# Patient Record
Sex: MALE | Employment: OTHER | URBAN - METROPOLITAN AREA
[De-identification: names, ages, dates, MRNs, and addresses within clinical notes are randomized per-mention and may not be internally consistent; named-entity substitution may affect disease eponyms.]

---

## 2017-07-17 ENCOUNTER — TELEPHONE (OUTPATIENT)
Dept: UROLOGY | Facility: CLINIC | Age: 72
End: 2017-07-17

## 2017-07-17 NOTE — TELEPHONE ENCOUNTER
----- Message from Victor Manuel Donis sent at 7/17/2017  3:54 PM CDT -----  Contact: Aung Nieves  X_  1st Request  _  2nd Request  _  3rd Request        Who: Aung Nieves    Why: Patient needs an order to get his PSA blood work done on Aug 8 at [facility]. Please call back to follow up.    What Number to Call Back: [phone]    When to Expect a call back: (With in 24 hours)

## 2017-07-17 NOTE — TELEPHONE ENCOUNTER
----- Message from Yeimy Wang sent at 7/17/2017  4:28 PM CDT -----  Contact: Self  X   1st Request  _  2nd Request  _  3rd Request        Who: KAYCEE KERN [[id number]]    Why: Pt states that he needs his lab work orders mailed [id number]  [place] [address]. Please call pt with any questions, thanks!    What Number to Call Back: [phone]    When to Expect a call back: (With in 24 hours)

## 2017-08-15 ENCOUNTER — OFFICE VISIT (OUTPATIENT)
Dept: UROLOGY | Facility: CLINIC | Age: 72
End: 2017-08-15
Attending: UROLOGY
Payer: MEDICARE

## 2017-08-15 VITALS
DIASTOLIC BLOOD PRESSURE: 83 MMHG | SYSTOLIC BLOOD PRESSURE: 158 MMHG | BODY MASS INDEX: 31.58 KG/M2 | WEIGHT: 185 LBS | HEART RATE: 53 BPM | HEIGHT: 64 IN

## 2017-08-15 DIAGNOSIS — C61 PROSTATE CANCER: Primary | ICD-10-CM

## 2017-08-15 PROCEDURE — 1126F AMNT PAIN NOTED NONE PRSNT: CPT | Mod: S$GLB,,, | Performed by: UROLOGY

## 2017-08-15 PROCEDURE — 1159F MED LIST DOCD IN RCRD: CPT | Mod: S$GLB,,, | Performed by: UROLOGY

## 2017-08-15 PROCEDURE — 3008F BODY MASS INDEX DOCD: CPT | Mod: S$GLB,,, | Performed by: UROLOGY

## 2017-08-15 PROCEDURE — 99214 OFFICE O/P EST MOD 30 MIN: CPT | Mod: S$GLB,,, | Performed by: UROLOGY

## 2017-08-15 NOTE — PROGRESS NOTES
"Subjective:      Aung Nieves is a 72 y.o. male who returns today regarding his     Prostate cancer 4 years status post surgery    Overactive bladder symptoms have resolved and he would like to stop the Ditropan if possible.    Rare low volume leakage with stress.  He wears one pad daily and usually this is not wet    The following portions of the patient's history were reviewed and updated as appropriate: allergies, current medications, past family history, past medical history, past social history, past surgical history and problem list.    Review of Systems  Pertinent items are noted in HPI.  A comprehensive multipoint review of systems was negative except as otherwise stated in the HPI.     Objective:   Vitals: BP (!) 158/83 (BP Location: Right arm, Patient Position: Sitting, BP Method: Large (Automatic))   Pulse (!) 53   Ht 5' 4" (1.626 m)   Wt 83.9 kg (185 lb)   BMI 31.76 kg/m²     Physical Exam   All counseling    Physical Exam    Lab Review   Urinalysis demonstrates no specimen    PSA less than 0.1  Pathology results from [facility] have been requested but were not received  No results found for: WBC, HGB, HCT, MCV, PLT  No results found for: CREATININE, BUN    Imaging  -  Assessment and Plan:   Prostate cancer YENI*4 years  -     Prostate Specific Antigen, Diagnostic; Future; Expected date: 08/13/2018  -     Prostate Specific Antigen, Diagnostic; Future; Expected date: 08/13/2018      [facility]  Stopped Ditropan.  If her urgency and frequency recurs restart  Avoid pm fluids  Avoid caffeine and alcohol  Timed voiding    return to clinic 1 year with PSA  "

## 2018-08-14 ENCOUNTER — OFFICE VISIT (OUTPATIENT)
Dept: UROLOGY | Facility: CLINIC | Age: 73
End: 2018-08-14
Attending: UROLOGY
Payer: MEDICARE

## 2018-08-14 VITALS
BODY MASS INDEX: 32.27 KG/M2 | DIASTOLIC BLOOD PRESSURE: 81 MMHG | HEIGHT: 64 IN | SYSTOLIC BLOOD PRESSURE: 134 MMHG | WEIGHT: 189 LBS | HEART RATE: 62 BPM

## 2018-08-14 DIAGNOSIS — Z85.46 HISTORY OF PROSTATE CANCER: Primary | ICD-10-CM

## 2018-08-14 PROCEDURE — 99214 OFFICE O/P EST MOD 30 MIN: CPT | Mod: S$GLB,,, | Performed by: UROLOGY

## 2018-08-14 NOTE — PROGRESS NOTES
"Subjective:      Aung Nieves is a 73 y.o. male who returns today regarding his     Prostate cancer 5 years status post radical prostatectomy.  Patient also had preoperative urinary retention.  He is voiding well.  He wears 1 pad daily and has occasional stress leakage.  Otherwise no complaints.  He is not having erections.  He spoke to his cardiologist and apparently his cardiologist has got some concerns about him having sexual activity.    His overactive bladder symptoms have resolved with dietary modifications and he has stopped the Ditropan    The following portions of the patient's history were reviewed and updated as appropriate: allergies, current medications, past family history, past medical history, past social history, past surgical history and problem list.    Review of Systems  Pertinent items are noted in HPI.  A comprehensive multipoint review of systems was negative except as otherwise stated in the HPI.     Objective:   Vitals: /81   Pulse 62   Ht 5' 4" (1.626 m)   Wt 85.7 kg (189 lb)   BMI 32.44 kg/m²     Physical Exam   General: alert and oriented, no acute distress  Respiratory: Symmetric expansion, non-labored breathing  Cardiovascular:  no peripheral edema  Abdomen: soft, non distended  Skin: normal coloration and turgor, no rashes, no suspicious skin lesions noted  Neuro: no gross deficits  Psych: normal judgment and insight, normal mood/affect and non-anxious    Physical Exam    Lab Review   Urinalysis demonstrates no specimen    PSA less than 0.1  No results found for: WBC, HGB, HCT, MCV, PLT  No results found for: CREATININE, BUN  No results found for: PSA, PSADIAG    Imaging  -  Assessment and Plan:   History of prostate cancer * 5 years  -     Prostate Specific Antigen, Diagnostic; Future; Expected date: 08/12/2019  -     Prostate Specific Antigen, Diagnostic; Future; Expected date: 08/12/2019     Overactive bladder resolved  Avoid pm fluids  Avoid caffeine and " alcohol  Timed voiding      Return to clinic 1 year with PSA

## 2019-08-20 ENCOUNTER — OFFICE VISIT (OUTPATIENT)
Dept: UROLOGY | Facility: CLINIC | Age: 74
End: 2019-08-20
Payer: MEDICARE

## 2019-08-20 VITALS
HEART RATE: 65 BPM | WEIGHT: 190 LBS | SYSTOLIC BLOOD PRESSURE: 142 MMHG | HEIGHT: 64 IN | BODY MASS INDEX: 32.44 KG/M2 | DIASTOLIC BLOOD PRESSURE: 85 MMHG

## 2019-08-20 DIAGNOSIS — Z85.46 HISTORY OF PROSTATE CANCER: Primary | ICD-10-CM

## 2019-08-20 PROCEDURE — 1101F PR PT FALLS ASSESS DOC 0-1 FALLS W/OUT INJ PAST YR: ICD-10-PCS | Mod: CPTII,S$GLB,, | Performed by: UROLOGY

## 2019-08-20 PROCEDURE — 99214 PR OFFICE/OUTPT VISIT, EST, LEVL IV, 30-39 MIN: ICD-10-PCS | Mod: S$GLB,,, | Performed by: UROLOGY

## 2019-08-20 PROCEDURE — 99214 OFFICE O/P EST MOD 30 MIN: CPT | Mod: S$GLB,,, | Performed by: UROLOGY

## 2019-08-20 PROCEDURE — 1101F PT FALLS ASSESS-DOCD LE1/YR: CPT | Mod: CPTII,S$GLB,, | Performed by: UROLOGY

## 2019-08-20 RX ORDER — IRBESARTAN 300 MG/1
TABLET ORAL
COMMUNITY
Start: 2019-05-27

## 2019-08-20 RX ORDER — DORZOLAMIDE HYDROCHLORIDE AND TIMOLOL MALEATE 20; 5 MG/ML; MG/ML
1 SOLUTION/ DROPS OPHTHALMIC
COMMUNITY
Start: 2013-12-05

## 2019-08-20 NOTE — PROGRESS NOTES
"Subjective:      Jose Nieves is a 74 y.o. male who returns today regarding his history of prostate cancer.    He underwent radical robotic prostatectomy in 2013.  He is voiding well.  He wears 1 pad daily and has occasional stress leakage.  Otherwise no complaints.  He is not having erections.  He spoke to his cardiologist and apparently his cardiologist has got some concerns about him having an MI and being unable to take nitroglycerin if taking PDE5i's. He is OK with current erections and with his urination.    He denies gross hematuria, dysuria, urgency, frequency, hesitancy, incomplete emptying, or intermittency.    The following portions of the patient's history were reviewed and updated as appropriate: allergies, current medications, past family history, past medical history, past social history, past surgical history and problem list.    Review of Systems  Pertinent items are noted in HPI.  A comprehensive multipoint review of systems was negative except as otherwise stated in the HPI.     Objective:   Vitals: BP (!) 142/85   Pulse 65   Ht 5' 4" (1.626 m)   Wt 86.2 kg (190 lb)   BMI 32.61 kg/m²     Physical Exam   General: alert and oriented, no acute distress  Respiratory: Symmetric expansion, non-labored breathing  Cardiovascular:  no peripheral edema  Abdomen: soft, non distended  Skin: normal coloration and turgor, no rashes, no suspicious skin lesions noted  Neuro: no gross deficits  Psych: normal judgment and insight, normal mood/affect and non-anxious    Lab Review   Urinalysis demonstrates trace blood, no RBC in microscopic analysis.    PSA less than 0.1  No results found for: WBC, HGB, HCT, MCV, PLT  No results found for: CREATININE, BUN  No results found for: PSA, PSADIAG    Assessment and Plan:   History of prostate cancer, s/p robotic prostatectomy in 2013  -     Prostate Specific Antigen, Diagnostic; Future; Expected date: 08/20/2020      Return to clinic 1 year with PSA  "

## 2020-01-21 ENCOUNTER — TELEPHONE (OUTPATIENT)
Dept: UROLOGY | Facility: CLINIC | Age: 75
End: 2020-01-21

## 2020-01-21 NOTE — TELEPHONE ENCOUNTER
----- Message from Daniella Cassidyfield sent at 1/21/2020  3:18 PM CST -----  Contact: JEREMIAH KERN   Type:  Patient Returning Call    Who Called: JEREMIAH KERN     Who Left Message for Patient: Lexi Garcia    Does the patient know what this is regarding?: Yes    Best Call Back Number: [phone]    Additional Information:

## 2020-01-21 NOTE — TELEPHONE ENCOUNTER
----- Message from Sofia Bryan sent at 1/21/2020 10:38 AM CST -----  Contact: JEREMIAH KERN [[id number]]  Name of Who is Calling : JEREMIAH KERN [[id number]]    Patient is requesting a call from staff in regards to patient is moving to [place] and would like to know if dr. López could refer a provider    .....Please contact to further discuss and advise.    Can the clinic reply by [facility] : No     What Number to Call Back :  [phone]

## 2020-01-21 NOTE — TELEPHONE ENCOUNTER
Pt called today and asked if there were any urologist in [place] that you could refer him to.  This is my neighbor who moved to [place] and would like to find a provider there.

## 2020-02-11 ENCOUNTER — TELEPHONE (OUTPATIENT)
Dept: UROLOGY | Facility: CLINIC | Age: 75
End: 2020-02-11

## 2020-02-11 NOTE — TELEPHONE ENCOUNTER
----- Message from Kathleen Concepcion sent at 2/11/2020  3:14 PM CST -----  Contact: JEREMIAH KERN [[id number]]  Type:  Patient Returning Call    Who Called: JEREMIAH KERN [[id number]]    Who Left Message for Patient: Kacey Ng    Does the patient know what this is regarding?:Y     Best Call Back Number:[phone]    Additional Information:

## 2020-02-11 NOTE — TELEPHONE ENCOUNTER
----- Message from Chery Ventura sent at 2/11/2020  1:31 PM CST -----  Contact: pt   Name of Who is Calling: JEREMIAH KERN [[id number]]    What is the request in detail: Patient is requesting a call back from Lexi The patient is requesting medical records ..... Please contact to further discuss and advise      Can the clinic reply by [facility]: No     [facility]:  [phone] (home)

## 2022-01-01 ENCOUNTER — HOSPITAL ENCOUNTER (INPATIENT)
Facility: HOSPITAL | Age: 77
LOS: 2 days | DRG: 814 | End: 2022-06-27
Attending: STUDENT IN AN ORGANIZED HEALTH CARE EDUCATION/TRAINING PROGRAM | Admitting: HOSPITALIST
Payer: MEDICARE

## 2022-01-01 VITALS
TEMPERATURE: 99 F | SYSTOLIC BLOOD PRESSURE: 91 MMHG | BODY MASS INDEX: 27.43 KG/M2 | RESPIRATION RATE: 24 BRPM | HEART RATE: 58 BPM | HEIGHT: 69 IN | OXYGEN SATURATION: 98 % | DIASTOLIC BLOOD PRESSURE: 58 MMHG | WEIGHT: 185.19 LBS

## 2022-01-01 DIAGNOSIS — R00.1 SYMPTOMATIC BRADYCARDIA: ICD-10-CM

## 2022-01-01 DIAGNOSIS — R42 DIZZINESS: Primary | ICD-10-CM

## 2022-01-01 DIAGNOSIS — R42 VERTIGO: ICD-10-CM

## 2022-01-01 DIAGNOSIS — D72.89 LEFT-SHIFTED WHITE BLOOD CELLS: ICD-10-CM

## 2022-01-01 DIAGNOSIS — R53.1 WEAKNESS: ICD-10-CM

## 2022-01-01 DIAGNOSIS — I50.9 CHF (CONGESTIVE HEART FAILURE): ICD-10-CM

## 2022-01-01 DIAGNOSIS — R52 PAIN: ICD-10-CM

## 2022-01-01 DIAGNOSIS — R42 LIGHTHEADEDNESS: ICD-10-CM

## 2022-01-01 DIAGNOSIS — D72.829 LEUKOCYTOSIS, UNSPECIFIED TYPE: ICD-10-CM

## 2022-01-01 LAB
ABO + RH BLD: NORMAL
ABORH RETYPE: NORMAL
ABS NEUT (OLG): 22.31 X10(3)/MCL (ref 2.1–9.2)
ALBUMIN SERPL-MCNC: 2 GM/DL (ref 3.4–4.8)
ALBUMIN SERPL-MCNC: 2.1 GM/DL (ref 3.4–4.8)
ALBUMIN SERPL-MCNC: 2.2 GM/DL (ref 3.4–4.8)
ALBUMIN SERPL-MCNC: 2.3 GM/DL (ref 3.4–4.8)
ALBUMIN/GLOB SERPL: 0.7 RATIO (ref 1.1–2)
ALBUMIN/GLOB SERPL: 0.8 RATIO (ref 1.1–2)
ALP SERPL-CCNC: 79 UNIT/L (ref 40–150)
ALP SERPL-CCNC: 81 UNIT/L (ref 40–150)
ALP SERPL-CCNC: 93 UNIT/L (ref 40–150)
ALP SERPL-CCNC: 95 UNIT/L (ref 40–150)
ALT SERPL-CCNC: 63 UNIT/L (ref 0–55)
ALT SERPL-CCNC: 68 UNIT/L (ref 0–55)
ALT SERPL-CCNC: 70 UNIT/L (ref 0–55)
ALT SERPL-CCNC: 78 UNIT/L (ref 0–55)
APPEARANCE UR: CLEAR
APPEARANCE UR: CLEAR
APTT PPP: 36.3 SECONDS (ref 23.2–33.7)
AST SERPL-CCNC: 48 UNIT/L (ref 5–34)
AST SERPL-CCNC: 49 UNIT/L (ref 5–34)
AST SERPL-CCNC: 56 UNIT/L (ref 5–34)
AST SERPL-CCNC: 61 UNIT/L (ref 5–34)
AV INDEX (PROSTH): 0.61
AV MEAN GRADIENT: 6 MMHG
AV PEAK GRADIENT: 11 MMHG
AV VALVE AREA: 2.3 CM2
AV VELOCITY RATIO: 0.62
BACTERIA #/AREA URNS AUTO: ABNORMAL /HPF
BACTERIA #/AREA URNS AUTO: ABNORMAL /HPF
BASE EXCESS ARTERIAL: NORMAL
BASOPHILS # BLD AUTO: 0.03 X10(3)/MCL (ref 0–0.2)
BASOPHILS # BLD AUTO: 0.04 X10(3)/MCL (ref 0–0.2)
BASOPHILS # BLD AUTO: 0.06 X10(3)/MCL (ref 0–0.2)
BASOPHILS NFR BLD AUTO: 0.2 %
BASOPHILS NFR BLD AUTO: 0.4 %
BASOPHILS NFR BLD AUTO: 0.5 %
BILIRUB UR QL STRIP.AUTO: ABNORMAL MG/DL
BILIRUB UR QL STRIP.AUTO: NEGATIVE MG/DL
BILIRUBIN DIRECT+TOT PNL SERPL-MCNC: 1.2 MG/DL
BILIRUBIN DIRECT+TOT PNL SERPL-MCNC: 1.2 MG/DL
BILIRUBIN DIRECT+TOT PNL SERPL-MCNC: 1.4 MG/DL
BILIRUBIN DIRECT+TOT PNL SERPL-MCNC: 2.3 MG/DL
BLD PROD TYP BPU: NORMAL
BLOOD UNIT EXPIRATION DATE: NORMAL
BLOOD UNIT TYPE CODE: 5100
BNP BLD-MCNC: 397.9 PG/ML
BSA FOR ECHO PROCEDURE: 2.02 M2
BUN SERPL-MCNC: 18.3 MG/DL (ref 8.4–25.7)
BUN SERPL-MCNC: 19.3 MG/DL (ref 8.4–25.7)
BUN SERPL-MCNC: 22.1 MG/DL (ref 8.4–25.7)
BUN SERPL-MCNC: 23 MG/DL (ref 8.4–25.7)
CALCIUM SERPL-MCNC: 8.1 MG/DL (ref 8.8–10)
CALCIUM SERPL-MCNC: 8.5 MG/DL (ref 8.8–10)
CALCIUM SERPL-MCNC: 8.5 MG/DL (ref 8.8–10)
CALCIUM SERPL-MCNC: 8.7 MG/DL (ref 8.8–10)
CHLORIDE SERPL-SCNC: 108 MMOL/L (ref 98–107)
CHLORIDE SERPL-SCNC: 109 MMOL/L (ref 98–107)
CHLORIDE SERPL-SCNC: 110 MMOL/L (ref 98–107)
CHLORIDE SERPL-SCNC: 110 MMOL/L (ref 98–107)
CO2 SERPL-SCNC: 20 MMOL/L (ref 23–31)
CO2 SERPL-SCNC: 21 MMOL/L (ref 23–31)
CO2 SERPL-SCNC: 23 MMOL/L (ref 23–31)
CO2 SERPL-SCNC: 23 MMOL/L (ref 23–31)
COLOR UR AUTO: ABNORMAL
COLOR UR AUTO: ABNORMAL
CREAT SERPL-MCNC: 1.01 MG/DL (ref 0.73–1.18)
CREAT SERPL-MCNC: 1.05 MG/DL (ref 0.73–1.18)
CREAT SERPL-MCNC: 1.17 MG/DL (ref 0.73–1.18)
CREAT SERPL-MCNC: 1.59 MG/DL (ref 0.73–1.18)
CROSSMATCH INTERPRETATION: NORMAL
CV ECHO LV RWT: 0.48 CM
DISPENSE STATUS: NORMAL
DOP CALC AO PEAK VEL: 1.64 M/S
DOP CALC AO VTI: 34.2 CM
DOP CALC LVOT AREA: 3.8 CM2
DOP CALC LVOT DIAMETER: 2.2 CM
DOP CALC LVOT PEAK VEL: 1.02 M/S
DOP CALC LVOT STROKE VOLUME: 78.65 CM3
DOP CALC MV VTI: 37.8 CM
DOP CALCLVOT PEAK VEL VTI: 20.7 CM
E WAVE DECELERATION TIME: 182 MSEC
E/A RATIO: 0.97
E/E' RATIO: 13.71 M/S
ECHO LV POSTERIOR WALL: 1.13 CM (ref 0.6–1.1)
EJECTION FRACTION: 55 %
EOSINOPHIL # BLD AUTO: 0.06 X10(3)/MCL (ref 0–0.9)
EOSINOPHIL # BLD AUTO: 0.13 X10(3)/MCL (ref 0–0.9)
EOSINOPHIL # BLD AUTO: 0.16 X10(3)/MCL (ref 0–0.9)
EOSINOPHIL NFR BLD AUTO: 0.5 %
EOSINOPHIL NFR BLD AUTO: 1 %
EOSINOPHIL NFR BLD AUTO: 1.7 %
ERYTHROCYTE [DISTWIDTH] IN BLOOD BY AUTOMATED COUNT: 15.5 % (ref 11.5–17)
ERYTHROCYTE [DISTWIDTH] IN BLOOD BY AUTOMATED COUNT: 15.7 % (ref 11.5–17)
ERYTHROCYTE [DISTWIDTH] IN BLOOD BY AUTOMATED COUNT: 15.9 % (ref 11.5–17)
ERYTHROCYTE [DISTWIDTH] IN BLOOD BY AUTOMATED COUNT: 15.9 % (ref 11.5–17)
FIBRINOGEN PPP-MCNC: 471 MG/DL (ref 210–463)
FLUAV AG UPPER RESP QL IA.RAPID: NOT DETECTED
FLUBV AG UPPER RESP QL IA.RAPID: NOT DETECTED
FRACTIONAL SHORTENING: 26 % (ref 28–44)
GLOBULIN SER-MCNC: 2.5 GM/DL (ref 2.4–3.5)
GLOBULIN SER-MCNC: 2.5 GM/DL (ref 2.4–3.5)
GLOBULIN SER-MCNC: 2.8 GM/DL (ref 2.4–3.5)
GLOBULIN SER-MCNC: 3.1 GM/DL (ref 2.4–3.5)
GLUCOSE SERPL-MCNC: 104 MG/DL (ref 82–115)
GLUCOSE SERPL-MCNC: 112 MG/DL (ref 82–115)
GLUCOSE SERPL-MCNC: 143 MG/DL (ref 82–115)
GLUCOSE SERPL-MCNC: 79 MG/DL (ref 82–115)
GLUCOSE UR QL STRIP.AUTO: NEGATIVE MG/DL
GLUCOSE UR QL STRIP.AUTO: NEGATIVE MG/DL
GROUP & RH: NORMAL
HAV IGM SERPL QL IA: NONREACTIVE
HBV CORE IGM SERPL QL IA: NONREACTIVE
HBV SURFACE AG SERPL QL IA: NONREACTIVE
HCO3 ARTERIAL: NORMAL
HCT VFR BLD AUTO: 37.6 % (ref 42–52)
HCT VFR BLD AUTO: 37.9 % (ref 42–52)
HCT VFR BLD AUTO: 39.7 % (ref 42–52)
HCT VFR BLD AUTO: 41.2 % (ref 42–52)
HCV AB SERPL QL IA: NONREACTIVE
HGB BLD-MCNC: 11.9 GM/DL (ref 14–18)
HGB BLD-MCNC: 12.3 GM/DL (ref 14–18)
HGB BLD-MCNC: 12.5 GM/DL (ref 14–18)
HGB BLD-MCNC: 13.1 GM/DL (ref 14–18)
HGB BLD-MCNC: NORMAL G/DL
IMM GRANULOCYTES # BLD AUTO: 0.11 X10(3)/MCL (ref 0–0.02)
IMM GRANULOCYTES # BLD AUTO: 0.11 X10(3)/MCL (ref 0–0.02)
IMM GRANULOCYTES # BLD AUTO: 0.14 X10(3)/MCL (ref 0–0.02)
IMM GRANULOCYTES # BLD AUTO: 0.3 X10(3)/MCL (ref 0–0.02)
IMM GRANULOCYTES NFR BLD AUTO: 0.9 % (ref 0–0.43)
IMM GRANULOCYTES NFR BLD AUTO: 1.1 % (ref 0–0.43)
IMM GRANULOCYTES NFR BLD AUTO: 1.2 % (ref 0–0.43)
IMM GRANULOCYTES NFR BLD AUTO: 1.3 % (ref 0–0.43)
INDIRECT COOMBS GEL: NORMAL
INR BLD: 1.28 (ref 0–1.3)
INR BLD: 1.64 (ref 0–1.3)
INSTRUMENT WBC (OLG): 23 X10(3)/MCL
INTERVENTRICULAR SEPTUM: 1.13 CM (ref 0.6–1.1)
KETONES UR QL STRIP.AUTO: ABNORMAL MG/DL
KETONES UR QL STRIP.AUTO: NEGATIVE MG/DL
LACTATE SERPL-SCNC: 0.8 MMOL/L (ref 0.5–2.2)
LACTATE SERPL-SCNC: 1.3 MMOL/L (ref 0.5–2.2)
LEFT ATRIUM SIZE: 3.6 CM
LEFT INTERNAL DIMENSION IN SYSTOLE: 3.47 CM (ref 2.1–4)
LEFT VENTRICLE DIASTOLIC VOLUME INDEX: 51.5 ML/M2
LEFT VENTRICLE DIASTOLIC VOLUME: 103 ML
LEFT VENTRICLE MASS INDEX: 98 G/M2
LEFT VENTRICLE SYSTOLIC VOLUME INDEX: 24.9 ML/M2
LEFT VENTRICLE SYSTOLIC VOLUME: 49.8 ML
LEFT VENTRICULAR INTERNAL DIMENSION IN DIASTOLE: 4.72 CM (ref 3.5–6)
LEFT VENTRICULAR MASS: 196.05 G
LEUKOCYTE ESTERASE UR QL STRIP.AUTO: ABNORMAL UNIT/L
LEUKOCYTE ESTERASE UR QL STRIP.AUTO: NEGATIVE UNIT/L
LIPASE SERPL-CCNC: 7 U/L
LV LATERAL E/E' RATIO: 12 M/S
LV SEPTAL E/E' RATIO: 16 M/S
LVOT MG: 2 MMHG
LVOT MV: 0.6 CM/S
LYMPHOCYTES # BLD AUTO: 0.89 X10(3)/MCL (ref 0.6–4.6)
LYMPHOCYTES # BLD AUTO: 0.91 X10(3)/MCL (ref 0.6–4.6)
LYMPHOCYTES # BLD AUTO: 1.02 X10(3)/MCL (ref 0.6–4.6)
LYMPHOCYTES NFR BLD AUTO: 11 %
LYMPHOCYTES NFR BLD AUTO: 6.9 %
LYMPHOCYTES NFR BLD AUTO: 7.1 %
LYMPHOCYTES NFR BLD MANUAL: 0.23 X10(3)/MCL
LYMPHOCYTES NFR BLD MANUAL: 1 %
MCH RBC QN AUTO: 30.5 PG (ref 27–31)
MCH RBC QN AUTO: 30.5 PG (ref 27–31)
MCH RBC QN AUTO: 30.6 PG (ref 27–31)
MCH RBC QN AUTO: 31.3 PG (ref 27–31)
MCHC RBC AUTO-ENTMCNC: 31 MG/DL (ref 33–36)
MCHC RBC AUTO-ENTMCNC: 31.6 MG/DL (ref 33–36)
MCHC RBC AUTO-ENTMCNC: 31.8 MG/DL (ref 33–36)
MCHC RBC AUTO-ENTMCNC: 33 MG/DL (ref 33–36)
MCV RBC AUTO: 94.8 FL (ref 80–94)
MCV RBC AUTO: 95.8 FL (ref 80–94)
MCV RBC AUTO: 96.7 FL (ref 80–94)
MCV RBC AUTO: 98.5 FL (ref 80–94)
METAMYELOCYTES NFR BLD MANUAL: 1 %
MONOCYTES # BLD AUTO: 0.67 X10(3)/MCL (ref 0.1–1.3)
MONOCYTES # BLD AUTO: 0.68 X10(3)/MCL (ref 0.1–1.3)
MONOCYTES # BLD AUTO: 0.75 X10(3)/MCL (ref 0.1–1.3)
MONOCYTES NFR BLD AUTO: 5.3 %
MONOCYTES NFR BLD AUTO: 5.8 %
MONOCYTES NFR BLD AUTO: 7.2 %
MONOCYTES NFR BLD MANUAL: 0.69 X10(3)/MCL (ref 0.1–1.3)
MONOCYTES NFR BLD MANUAL: 3 %
MV MEAN GRADIENT: 2 MMHG
MV PEAK A VEL: 0.99 M/S
MV PEAK E VEL: 0.96 M/S
MV PEAK GRADIENT: 4 MMHG
MV VALVE AREA BY CONTINUITY EQUATION: 2.08 CM2
NEUTROPHILS # BLD AUTO: 10.9 X10(3)/MCL (ref 2.1–9.2)
NEUTROPHILS # BLD AUTO: 11.1 X10(3)/MCL (ref 2.1–9.2)
NEUTROPHILS # BLD AUTO: 7.3 X10(3)/MCL (ref 2.1–9.2)
NEUTROPHILS NFR BLD AUTO: 78.5 %
NEUTROPHILS NFR BLD AUTO: 85 %
NEUTROPHILS NFR BLD AUTO: 85.7 %
NEUTROPHILS NFR BLD MANUAL: 96 %
NITRITE UR QL STRIP.AUTO: NEGATIVE
NITRITE UR QL STRIP.AUTO: NEGATIVE
NRBC BLD AUTO-RTO: 0 %
PCO2 BLDA: 29 MMHG
PCO2 BLDA: NORMAL MM[HG]
PCO2 BLDA: NORMAL MM[HG]
PH SMN: 7.46 [PH] (ref 7.35–7.45)
PH SMN: NORMAL [PH]
PH UR STRIP.AUTO: 5.5 [PH]
PH UR STRIP.AUTO: 5.5 [PH]
PISA TR MAX VEL: 2.71 M/S
PLATELET # BLD AUTO: 132 X10(3)/MCL (ref 130–400)
PLATELET # BLD AUTO: 145 X10(3)/MCL (ref 130–400)
PLATELET # BLD AUTO: 159 X10(3)/MCL (ref 130–400)
PLATELET # BLD AUTO: 160 X10(3)/MCL (ref 130–400)
PLATELET # BLD EST: NORMAL 10*3/UL
PMV BLD AUTO: 11.1 FL (ref 9.4–12.4)
PMV BLD AUTO: 11.1 FL (ref 9.4–12.4)
PMV BLD AUTO: 11.3 FL (ref 9.4–12.4)
PMV BLD AUTO: 11.3 FL (ref 9.4–12.4)
PO2 BLDA: 128 MMHG
PO2 BLDA: NORMAL MM[HG]
POC BASE DEFICIT: -2.2 MMOL/L
POC COHB: NORMAL
POC HCO3: 20.6 MMOL/L
POC IONIZED CALCIUM: 1.23 MMOL/L
POC IONIZED CALCIUM: NORMAL
POC METHB: NORMAL
POC O2HB: NORMAL
POC SATURATED O2: 99 %
POC TEMPERATURE: 37 C
POTASSIUM BLD-SCNC: 3.8 MMOL/L
POTASSIUM BLD-SCNC: NORMAL MMOL/L
POTASSIUM SERPL-SCNC: 3.6 MMOL/L (ref 3.5–5.1)
POTASSIUM SERPL-SCNC: 3.8 MMOL/L (ref 3.5–5.1)
POTASSIUM SERPL-SCNC: 3.8 MMOL/L (ref 3.5–5.1)
POTASSIUM SERPL-SCNC: 4.8 MMOL/L (ref 3.5–5.1)
PROT SERPL-MCNC: 4.5 GM/DL (ref 5.8–7.6)
PROT SERPL-MCNC: 4.6 GM/DL (ref 5.8–7.6)
PROT SERPL-MCNC: 5.1 GM/DL (ref 5.8–7.6)
PROT SERPL-MCNC: 5.3 GM/DL (ref 5.8–7.6)
PROT UR QL STRIP.AUTO: ABNORMAL MG/DL
PROT UR QL STRIP.AUTO: NEGATIVE MG/DL
PROTHROMBIN TIME: 15.9 SECONDS (ref 12.5–14.5)
PROTHROMBIN TIME: 19.2 SECONDS (ref 12.5–14.5)
RA PRESSURE: 3 MMHG
RBC # BLD AUTO: 3.89 X10(6)/MCL (ref 4.7–6.1)
RBC # BLD AUTO: 4 X10(6)/MCL (ref 4.7–6.1)
RBC # BLD AUTO: 4.03 X10(6)/MCL (ref 4.7–6.1)
RBC # BLD AUTO: 4.3 X10(6)/MCL (ref 4.7–6.1)
RBC #/AREA URNS AUTO: 10 /HPF
RBC #/AREA URNS AUTO: 15 /HPF
RBC UR QL AUTO: ABNORMAL UNIT/L
RBC UR QL AUTO: ABNORMAL UNIT/L
SARS-COV-2 RNA RESP QL NAA+PROBE: NOT DETECTED
SATURATED O2 ARTERIAL, I-STAT: NORMAL
SODIUM BLD-SCNC: 135 MMOL/L (ref 137–145)
SODIUM BLD-SCNC: NORMAL MMOL/L
SODIUM SERPL-SCNC: 136 MMOL/L (ref 136–145)
SODIUM SERPL-SCNC: 136 MMOL/L (ref 136–145)
SODIUM SERPL-SCNC: 137 MMOL/L (ref 136–145)
SODIUM SERPL-SCNC: 137 MMOL/L (ref 136–145)
SP GR UR STRIP.AUTO: 1.01 (ref 1–1.03)
SP GR UR STRIP.AUTO: 1.02 (ref 1–1.03)
SPECIMEN SOURCE: ABNORMAL
SQUAMOUS #/AREA URNS AUTO: <5 /HPF
SQUAMOUS #/AREA URNS AUTO: <5 /HPF
T3FREE SERPL-MCNC: 1.45 PG/ML (ref 1.57–3.91)
T4 FREE SERPL-MCNC: 1.27 NG/DL (ref 0.7–1.48)
T4 FREE SERPL-MCNC: 1.47 NG/DL (ref 0.7–1.48)
TDI LATERAL: 0.08 M/S
TDI SEPTAL: 0.06 M/S
TDI: 0.07 M/S
TR MAX PG: 29 MMHG
TRICUSPID ANNULAR PLANE SYSTOLIC EXCURSION: 2.72 CM
TROPONIN I SERPL-MCNC: 0.04 NG/ML (ref 0–0.04)
TROPONIN I SERPL-MCNC: 0.04 NG/ML (ref 0–0.04)
TROPONIN I SERPL-MCNC: 0.06 NG/ML (ref 0–0.04)
TROPONIN I SERPL-MCNC: 0.07 NG/ML (ref 0–0.04)
TSH SERPL-ACNC: 0.22 UIU/ML (ref 0.35–4.94)
TV REST PULMONARY ARTERY PRESSURE: 32 MMHG
UNIT NUMBER: NORMAL
UROBILINOGEN UR STRIP-ACNC: 2 MG/DL
UROBILINOGEN UR STRIP-ACNC: 2 MG/DL
WBC # SPEC AUTO: 12.8 X10(3)/MCL (ref 4.5–11.5)
WBC # SPEC AUTO: 12.9 X10(3)/MCL (ref 4.5–11.5)
WBC # SPEC AUTO: 23 X10(3)/MCL (ref 4.5–11.5)
WBC # SPEC AUTO: 9.3 X10(3)/MCL (ref 4.5–11.5)
WBC #/AREA URNS AUTO: <5 /HPF
WBC #/AREA URNS AUTO: <5 /HPF

## 2022-01-01 PROCEDURE — 96374 THER/PROPH/DIAG INJ IV PUSH: CPT | Mod: 59

## 2022-01-01 PROCEDURE — 25000003 PHARM REV CODE 250: Performed by: PHYSICIAN ASSISTANT

## 2022-01-01 PROCEDURE — 36415 COLL VENOUS BLD VENIPUNCTURE: CPT | Performed by: STUDENT IN AN ORGANIZED HEALTH CARE EDUCATION/TRAINING PROGRAM

## 2022-01-01 PROCEDURE — 99223 PR INITIAL HOSPITAL CARE,LEVL III: ICD-10-PCS | Mod: ,,, | Performed by: NURSE PRACTITIONER

## 2022-01-01 PROCEDURE — 51798 US URINE CAPACITY MEASURE: CPT

## 2022-01-01 PROCEDURE — 63600175 PHARM REV CODE 636 W HCPCS: Performed by: PHYSICIAN ASSISTANT

## 2022-01-01 PROCEDURE — 63600150 PHARM REV CODE 636

## 2022-01-01 PROCEDURE — 63600175 PHARM REV CODE 636 W HCPCS: Performed by: INTERNAL MEDICINE

## 2022-01-01 PROCEDURE — 99223 1ST HOSP IP/OBS HIGH 75: CPT | Mod: ,,, | Performed by: NURSE PRACTITIONER

## 2022-01-01 PROCEDURE — 85025 COMPLETE CBC W/AUTO DIFF WBC: CPT | Performed by: STUDENT IN AN ORGANIZED HEALTH CARE EDUCATION/TRAINING PROGRAM

## 2022-01-01 PROCEDURE — 93005 ELECTROCARDIOGRAM TRACING: CPT

## 2022-01-01 PROCEDURE — 96360 HYDRATION IV INFUSION INIT: CPT | Mod: 59

## 2022-01-01 PROCEDURE — 99900035 HC TECH TIME PER 15 MIN (STAT)

## 2022-01-01 PROCEDURE — 80053 COMPREHEN METABOLIC PANEL: CPT | Performed by: PHYSICIAN ASSISTANT

## 2022-01-01 PROCEDURE — 36415 COLL VENOUS BLD VENIPUNCTURE: CPT | Performed by: PHYSICIAN ASSISTANT

## 2022-01-01 PROCEDURE — 87040 BLOOD CULTURE FOR BACTERIA: CPT | Performed by: STUDENT IN AN ORGANIZED HEALTH CARE EDUCATION/TRAINING PROGRAM

## 2022-01-01 PROCEDURE — 36600 WITHDRAWAL OF ARTERIAL BLOOD: CPT

## 2022-01-01 PROCEDURE — 85730 THROMBOPLASTIN TIME PARTIAL: CPT | Performed by: STUDENT IN AN ORGANIZED HEALTH CARE EDUCATION/TRAINING PROGRAM

## 2022-01-01 PROCEDURE — 83690 ASSAY OF LIPASE: CPT | Performed by: STUDENT IN AN ORGANIZED HEALTH CARE EDUCATION/TRAINING PROGRAM

## 2022-01-01 PROCEDURE — 27000221 HC OXYGEN, UP TO 24 HOURS

## 2022-01-01 PROCEDURE — 94002 VENT MGMT INPAT INIT DAY: CPT

## 2022-01-01 PROCEDURE — 85610 PROTHROMBIN TIME: CPT | Performed by: STUDENT IN AN ORGANIZED HEALTH CARE EDUCATION/TRAINING PROGRAM

## 2022-01-01 PROCEDURE — 63600175 PHARM REV CODE 636 W HCPCS

## 2022-01-01 PROCEDURE — 84484 ASSAY OF TROPONIN QUANT: CPT | Performed by: PHYSICIAN ASSISTANT

## 2022-01-01 PROCEDURE — 36415 COLL VENOUS BLD VENIPUNCTURE: CPT | Performed by: HOSPITALIST

## 2022-01-01 PROCEDURE — 83605 ASSAY OF LACTIC ACID: CPT | Performed by: STUDENT IN AN ORGANIZED HEALTH CARE EDUCATION/TRAINING PROGRAM

## 2022-01-01 PROCEDURE — 25000003 PHARM REV CODE 250: Performed by: STUDENT IN AN ORGANIZED HEALTH CARE EDUCATION/TRAINING PROGRAM

## 2022-01-01 PROCEDURE — 86965 POOLING BLOOD PLATELETS: CPT | Performed by: STUDENT IN AN ORGANIZED HEALTH CARE EDUCATION/TRAINING PROGRAM

## 2022-01-01 PROCEDURE — 84439 ASSAY OF FREE THYROXINE: CPT | Performed by: HOSPITALIST

## 2022-01-01 PROCEDURE — 85384 FIBRINOGEN ACTIVITY: CPT | Performed by: STUDENT IN AN ORGANIZED HEALTH CARE EDUCATION/TRAINING PROGRAM

## 2022-01-01 PROCEDURE — 87636 SARSCOV2 & INF A&B AMP PRB: CPT | Performed by: STUDENT IN AN ORGANIZED HEALTH CARE EDUCATION/TRAINING PROGRAM

## 2022-01-01 PROCEDURE — P9012 CRYOPRECIPITATE EACH UNIT: HCPCS | Performed by: STUDENT IN AN ORGANIZED HEALTH CARE EDUCATION/TRAINING PROGRAM

## 2022-01-01 PROCEDURE — 80053 COMPREHEN METABOLIC PANEL: CPT | Performed by: STUDENT IN AN ORGANIZED HEALTH CARE EDUCATION/TRAINING PROGRAM

## 2022-01-01 PROCEDURE — 80074 ACUTE HEPATITIS PANEL: CPT | Performed by: HOSPITALIST

## 2022-01-01 PROCEDURE — 99285 EMERGENCY DEPT VISIT HI MDM: CPT | Mod: 25

## 2022-01-01 PROCEDURE — 97535 SELF CARE MNGMENT TRAINING: CPT

## 2022-01-01 PROCEDURE — 36430 TRANSFUSION BLD/BLD COMPNT: CPT

## 2022-01-01 PROCEDURE — 63600175 PHARM REV CODE 636 W HCPCS: Performed by: STUDENT IN AN ORGANIZED HEALTH CARE EDUCATION/TRAINING PROGRAM

## 2022-01-01 PROCEDURE — 86901 BLOOD TYPING SEROLOGIC RH(D): CPT | Performed by: INTERNAL MEDICINE

## 2022-01-01 PROCEDURE — 84439 ASSAY OF FREE THYROXINE: CPT | Performed by: STUDENT IN AN ORGANIZED HEALTH CARE EDUCATION/TRAINING PROGRAM

## 2022-01-01 PROCEDURE — 84481 FREE ASSAY (FT-3): CPT | Performed by: PHYSICIAN ASSISTANT

## 2022-01-01 PROCEDURE — 85025 COMPLETE CBC W/AUTO DIFF WBC: CPT | Performed by: HOSPITALIST

## 2022-01-01 PROCEDURE — 83880 ASSAY OF NATRIURETIC PEPTIDE: CPT | Performed by: STUDENT IN AN ORGANIZED HEALTH CARE EDUCATION/TRAINING PROGRAM

## 2022-01-01 PROCEDURE — 82803 BLOOD GASES ANY COMBINATION: CPT

## 2022-01-01 PROCEDURE — 84484 ASSAY OF TROPONIN QUANT: CPT | Performed by: STUDENT IN AN ORGANIZED HEALTH CARE EDUCATION/TRAINING PROGRAM

## 2022-01-01 PROCEDURE — 11000001 HC ACUTE MED/SURG PRIVATE ROOM

## 2022-01-01 PROCEDURE — 81001 URINALYSIS AUTO W/SCOPE: CPT | Performed by: STUDENT IN AN ORGANIZED HEALTH CARE EDUCATION/TRAINING PROGRAM

## 2022-01-01 PROCEDURE — 84443 ASSAY THYROID STIM HORMONE: CPT | Performed by: STUDENT IN AN ORGANIZED HEALTH CARE EDUCATION/TRAINING PROGRAM

## 2022-01-01 PROCEDURE — 80053 COMPREHEN METABOLIC PANEL: CPT | Performed by: HOSPITALIST

## 2022-01-01 PROCEDURE — 99223 PR INITIAL HOSPITAL CARE,LEVL III: ICD-10-PCS | Mod: ,,, | Performed by: PSYCHIATRY & NEUROLOGY

## 2022-01-01 PROCEDURE — 99223 1ST HOSP IP/OBS HIGH 75: CPT | Mod: ,,, | Performed by: PSYCHIATRY & NEUROLOGY

## 2022-01-01 PROCEDURE — 85025 COMPLETE CBC W/AUTO DIFF WBC: CPT | Performed by: PHYSICIAN ASSISTANT

## 2022-01-01 PROCEDURE — 51701 INSERT BLADDER CATHETER: CPT

## 2022-01-01 PROCEDURE — 27200966 HC CLOSED SUCTION SYSTEM

## 2022-01-01 PROCEDURE — P9017 PLASMA 1 DONOR FRZ W/IN 8 HR: HCPCS | Performed by: STUDENT IN AN ORGANIZED HEALTH CARE EDUCATION/TRAINING PROGRAM

## 2022-01-01 PROCEDURE — 97166 OT EVAL MOD COMPLEX 45 MIN: CPT

## 2022-01-01 PROCEDURE — 94761 N-INVAS EAR/PLS OXIMETRY MLT: CPT

## 2022-01-01 PROCEDURE — 25000003 PHARM REV CODE 250: Performed by: HOSPITALIST

## 2022-01-01 RX ORDER — SODIUM CHLORIDE 0.9 % (FLUSH) 0.9 %
10 SYRINGE (ML) INJECTION EVERY 12 HOURS PRN
Status: DISCONTINUED | OUTPATIENT
Start: 2022-01-01 | End: 2022-01-01 | Stop reason: HOSPADM

## 2022-01-01 RX ORDER — FAMOTIDINE 10 MG/ML
20 INJECTION INTRAVENOUS 2 TIMES DAILY
Status: DISCONTINUED | OUTPATIENT
Start: 2022-01-01 | End: 2022-01-01 | Stop reason: HOSPADM

## 2022-01-01 RX ORDER — MORPHINE SULFATE 4 MG/ML
2 INJECTION, SOLUTION INTRAMUSCULAR; INTRAVENOUS EVERY 30 MIN PRN
Status: DISCONTINUED | OUTPATIENT
Start: 2022-01-01 | End: 2022-01-01 | Stop reason: HOSPADM

## 2022-01-01 RX ORDER — FENTANYL CITRATE 50 UG/ML
50 INJECTION, SOLUTION INTRAMUSCULAR; INTRAVENOUS
Status: DISCONTINUED | OUTPATIENT
Start: 2022-01-01 | End: 2022-01-01 | Stop reason: HOSPADM

## 2022-01-01 RX ORDER — GLYCOPYRROLATE 1 MG/5ML
1 SOLUTION ORAL 3 TIMES DAILY PRN
Status: DISCONTINUED | OUTPATIENT
Start: 2022-01-01 | End: 2022-01-01

## 2022-01-01 RX ORDER — SODIUM CHLORIDE 0.9 % (FLUSH) 0.9 %
10 SYRINGE (ML) INJECTION
Status: DISCONTINUED | OUTPATIENT
Start: 2022-01-01 | End: 2022-01-01 | Stop reason: HOSPADM

## 2022-01-01 RX ORDER — PROPOFOL 10 MG/ML
VIAL (ML) INTRAVENOUS CODE/TRAUMA/SEDATION MEDICATION
Status: COMPLETED | OUTPATIENT
Start: 2022-01-01 | End: 2022-01-01

## 2022-01-01 RX ORDER — HYDROMORPHONE HYDROCHLORIDE 2 MG/ML
0.2 INJECTION, SOLUTION INTRAMUSCULAR; INTRAVENOUS; SUBCUTANEOUS EVERY 10 MIN PRN
Status: DISCONTINUED | OUTPATIENT
Start: 2022-01-01 | End: 2022-01-01 | Stop reason: HOSPADM

## 2022-01-01 RX ORDER — HYDROCODONE BITARTRATE AND ACETAMINOPHEN 500; 5 MG/1; MG/1
TABLET ORAL
Status: DISCONTINUED | OUTPATIENT
Start: 2022-01-01 | End: 2022-01-01 | Stop reason: HOSPADM

## 2022-01-01 RX ORDER — ACETAMINOPHEN 325 MG/1
650 TABLET ORAL EVERY 4 HOURS PRN
Status: DISCONTINUED | OUTPATIENT
Start: 2022-01-01 | End: 2022-01-01 | Stop reason: HOSPADM

## 2022-01-01 RX ORDER — DOPAMINE HYDROCHLORIDE 320 MG/100ML
INJECTION, SOLUTION INTRAVENOUS
Status: COMPLETED
Start: 2022-01-01 | End: 2022-01-01

## 2022-01-01 RX ORDER — MECLIZINE HYDROCHLORIDE 25 MG/1
25 TABLET ORAL
Status: COMPLETED | OUTPATIENT
Start: 2022-01-01 | End: 2022-01-01

## 2022-01-01 RX ORDER — HYDROCODONE BITARTRATE AND ACETAMINOPHEN 5; 325 MG/1; MG/1
1 TABLET ORAL EVERY 6 HOURS PRN
Status: DISCONTINUED | OUTPATIENT
Start: 2022-01-01 | End: 2022-01-01 | Stop reason: HOSPADM

## 2022-01-01 RX ORDER — FENTANYL CITRATE 50 UG/ML
50 INJECTION, SOLUTION INTRAMUSCULAR; INTRAVENOUS
Status: DISCONTINUED | OUTPATIENT
Start: 2022-07-01 | End: 2022-01-01 | Stop reason: HOSPADM

## 2022-01-01 RX ORDER — GLUCAGON 1 MG
1 KIT INJECTION
Status: DISCONTINUED | OUTPATIENT
Start: 2022-01-01 | End: 2022-01-01 | Stop reason: HOSPADM

## 2022-01-01 RX ORDER — ATROPINE SULFATE 0.1 MG/ML
INJECTION INTRAVENOUS
Status: COMPLETED
Start: 2022-01-01 | End: 2022-01-01

## 2022-01-01 RX ORDER — AMIODARONE HYDROCHLORIDE 200 MG/1
200 TABLET ORAL 2 TIMES DAILY
Status: DISCONTINUED | OUTPATIENT
Start: 2022-01-01 | End: 2022-01-01 | Stop reason: HOSPADM

## 2022-01-01 RX ORDER — DEXMEDETOMIDINE HYDROCHLORIDE 4 UG/ML
0-1.4 INJECTION, SOLUTION INTRAVENOUS CONTINUOUS
Status: DISCONTINUED | OUTPATIENT
Start: 2022-01-01 | End: 2022-01-01 | Stop reason: HOSPADM

## 2022-01-01 RX ORDER — CEFDINIR 300 MG/1
300 CAPSULE ORAL EVERY 12 HOURS
Status: DISCONTINUED | OUTPATIENT
Start: 2022-01-01 | End: 2022-01-01 | Stop reason: HOSPADM

## 2022-01-01 RX ORDER — NOREPINEPHRINE BITARTRATE/D5W 8 MG/250ML
0-3 PLASTIC BAG, INJECTION (ML) INTRAVENOUS CONTINUOUS
Status: DISCONTINUED | OUTPATIENT
Start: 2022-01-01 | End: 2022-01-01 | Stop reason: HOSPADM

## 2022-01-01 RX ORDER — IBUPROFEN 200 MG
24 TABLET ORAL
Status: DISCONTINUED | OUTPATIENT
Start: 2022-01-01 | End: 2022-01-01 | Stop reason: HOSPADM

## 2022-01-01 RX ORDER — DOPAMINE HYDROCHLORIDE 320 MG/100ML
0-20 INJECTION, SOLUTION INTRAVENOUS CONTINUOUS
Status: DISCONTINUED | OUTPATIENT
Start: 2022-01-01 | End: 2022-01-01

## 2022-01-01 RX ORDER — GLYCOPYRROLATE 0.2 MG/ML
0.2 INJECTION INTRAMUSCULAR; INTRAVENOUS EVERY 8 HOURS PRN
Status: DISCONTINUED | OUTPATIENT
Start: 2022-01-01 | End: 2022-01-01 | Stop reason: HOSPADM

## 2022-01-01 RX ORDER — MORPHINE SULFATE 4 MG/ML
1 INJECTION, SOLUTION INTRAMUSCULAR; INTRAVENOUS EVERY 10 MIN PRN
Status: DISCONTINUED | OUTPATIENT
Start: 2022-01-01 | End: 2022-01-01 | Stop reason: HOSPADM

## 2022-01-01 RX ORDER — ACETAMINOPHEN 325 MG/1
650 TABLET ORAL EVERY 8 HOURS PRN
Status: DISCONTINUED | OUTPATIENT
Start: 2022-01-01 | End: 2022-01-01 | Stop reason: HOSPADM

## 2022-01-01 RX ORDER — IBUPROFEN 200 MG
16 TABLET ORAL
Status: DISCONTINUED | OUTPATIENT
Start: 2022-01-01 | End: 2022-01-01 | Stop reason: HOSPADM

## 2022-01-01 RX ORDER — TRAMADOL HYDROCHLORIDE 50 MG/1
100 TABLET ORAL
Status: COMPLETED | OUTPATIENT
Start: 2022-01-01 | End: 2022-01-01

## 2022-01-01 RX ORDER — DOPAMINE HYDROCHLORIDE 320 MG/100ML
0-20 INJECTION, SOLUTION INTRAVENOUS CONTINUOUS
Status: DISCONTINUED | OUTPATIENT
Start: 2022-01-01 | End: 2022-01-01 | Stop reason: HOSPADM

## 2022-01-01 RX ORDER — LOSARTAN POTASSIUM 50 MG/1
100 TABLET ORAL DAILY
Status: DISCONTINUED | OUTPATIENT
Start: 2022-01-01 | End: 2022-01-01 | Stop reason: HOSPADM

## 2022-01-01 RX ORDER — CHLORHEXIDINE GLUCONATE ORAL RINSE 1.2 MG/ML
15 SOLUTION DENTAL 2 TIMES DAILY
Status: DISCONTINUED | OUTPATIENT
Start: 2022-01-01 | End: 2022-01-01 | Stop reason: HOSPADM

## 2022-01-01 RX ORDER — PROPOFOL 10 MG/ML
0-50 INJECTION, EMULSION INTRAVENOUS CONTINUOUS
Status: DISCONTINUED | OUTPATIENT
Start: 2022-01-01 | End: 2022-01-01 | Stop reason: HOSPADM

## 2022-01-01 RX ORDER — ATROPINE SULFATE 0.1 MG/ML
1 INJECTION INTRAVENOUS
Status: DISCONTINUED | OUTPATIENT
Start: 2022-01-01 | End: 2022-01-01 | Stop reason: HOSPADM

## 2022-01-01 RX ORDER — ONDANSETRON 2 MG/ML
4 INJECTION INTRAMUSCULAR; INTRAVENOUS EVERY 4 HOURS PRN
Status: DISCONTINUED | OUTPATIENT
Start: 2022-01-01 | End: 2022-01-01 | Stop reason: HOSPADM

## 2022-01-01 RX ORDER — SODIUM CHLORIDE 9 MG/ML
INJECTION, SOLUTION INTRAVENOUS CONTINUOUS
Status: DISCONTINUED | OUTPATIENT
Start: 2022-01-01 | End: 2022-01-01 | Stop reason: HOSPADM

## 2022-01-01 RX ORDER — ENOXAPARIN SODIUM 100 MG/ML
40 INJECTION SUBCUTANEOUS EVERY 24 HOURS
Status: DISCONTINUED | OUTPATIENT
Start: 2022-01-01 | End: 2022-01-01 | Stop reason: SDUPTHER

## 2022-01-01 RX ADMIN — SODIUM CHLORIDE, POTASSIUM CHLORIDE, SODIUM LACTATE AND CALCIUM CHLORIDE 1000 ML: 600; 310; 30; 20 INJECTION, SOLUTION INTRAVENOUS at 10:06

## 2022-01-01 RX ADMIN — CEFTRIAXONE SODIUM 2 G: 2 INJECTION, POWDER, FOR SOLUTION INTRAMUSCULAR; INTRAVENOUS at 01:06

## 2022-01-01 RX ADMIN — MECLIZINE HYDROCHLORIDE 25 MG: 25 TABLET ORAL at 06:06

## 2022-01-01 RX ADMIN — ATROPINE SULFATE 1 MG: 0.1 INJECTION INTRAVENOUS at 01:06

## 2022-01-01 RX ADMIN — HYDROCODONE BITARTRATE AND ACETAMINOPHEN 1 TABLET: 5; 325 TABLET ORAL at 06:06

## 2022-01-01 RX ADMIN — SODIUM CHLORIDE: 9 INJECTION, SOLUTION INTRAVENOUS at 10:06

## 2022-01-01 RX ADMIN — CEFDINIR 300 MG: 300 CAPSULE ORAL at 09:06

## 2022-01-01 RX ADMIN — DOPAMINE HYDROCHLORIDE 800 MG: 320 INJECTION, SOLUTION INTRAVENOUS at 02:06

## 2022-01-01 RX ADMIN — APIXABAN 5 MG: 5 TABLET, FILM COATED ORAL at 09:06

## 2022-01-01 RX ADMIN — SODIUM CHLORIDE: 9 INJECTION, SOLUTION INTRAVENOUS at 01:06

## 2022-01-01 RX ADMIN — TRAMADOL HYDROCHLORIDE 100 MG: 50 TABLET, COATED ORAL at 01:06

## 2022-01-01 RX ADMIN — ONDANSETRON 4 MG: 2 INJECTION INTRAMUSCULAR; INTRAVENOUS at 10:06

## 2022-01-01 RX ADMIN — LOSARTAN POTASSIUM 100 MG: 50 TABLET, FILM COATED ORAL at 09:06

## 2022-01-01 RX ADMIN — APIXABAN 5 MG: 5 TABLET, FILM COATED ORAL at 08:06

## 2022-01-01 RX ADMIN — ENOXAPARIN SODIUM 40 MG: 40 INJECTION SUBCUTANEOUS at 05:06

## 2022-01-01 RX ADMIN — SODIUM CHLORIDE: 9 INJECTION, SOLUTION INTRAVENOUS at 03:06

## 2022-01-01 RX ADMIN — DOPAMINE HYDROCHLORIDE IN DEXTROSE 800 MG: 3.2 INJECTION, SOLUTION INTRAVENOUS at 02:06

## 2022-01-01 RX ADMIN — PROPOFOL 80 MG: 10 INJECTION, EMULSION INTRAVENOUS at 11:06

## 2022-01-01 RX ADMIN — LOSARTAN POTASSIUM 100 MG: 50 TABLET, FILM COATED ORAL at 08:06

## 2022-06-25 NOTE — H&P
"[facility] Medicine History & Physical Examination       Patient Name: Jose Nieves  MRN: [id number]  Patient Class: IP- Inpatient   Admission Date: 6/25/2022   Admitting Physician: Jm Key MD   Length of Stay: 0  Attending Physician: Jm Key MD   Primary Care Provider: Leonard Minaya II, MD  Face-to-Face encounter date: 06/25/2022  Code Status: Full Code  Chief Complaint: Dizziness (Complaint of dizzyness x 1 month.  Very nauseated.  States dizziness became worse tonight.  States waiting on lab results from Dr. Minaya.  Was also seen at the [facility].  )        Patient information was obtained from patient, patient's family, past medical records and ER records.     HISTORY OF PRESENT ILLNESS:   Jose Nieves is a 77 y.o. Patient male with a past medical history of atrial fibrillation on Eliquis, hypertension, hyperlipidemia, prostate cancer. The patient presented to [facility] on 6/25/2022 with a primary complaint of dizziness and chills which began last night (06/24/2022).  He also complains of a "bad taste" in his mouth. He denies complaints chest pain, shortness of breath, headache, vision changes, focal weakness, cough, abdominal pain, nausea, vomiting, diarrhea.  Patient has been seen in the ED several times this month.  He was seen on at [facility] on 6/11 and 6/15. He was told to be hypotensive and given IV fluids.  COVID-19 and flu were negative.  Liver enzymes were elevated and appointment was scheduled with Dr. Sorenson. Patient reports Dr. Sorenson scheduled an ultrasound for next week.  In addition patient was taken off of his amiodarone and pravastatin.  Patient's cardiologist is Dr. ballesteros.    Upon presentation to the ED, patient afebrile, normotensive, heart rate 68 and SpO2 94% on room air. Heart rate decreased to 48. Labs notable for WBC 23, stable macrocytic anemia, BUN/creatinine 23/1.59 (20/1.54 in September " 2021), , troponin 0.044, TSH .223.  UA with 15 rbc's, trace leukocyte, 3+ blood, trace protein. EKG sinus rhythm with occasional premature ventricular complexes and nonspecific ST abnormalities.  Chest x-ray with blunting of the lateral portion of the right costophrenic angle and mild atelectatic changes in the left lung base.  CT of the head negative for acute intracranial findings.  In the ED patient received meclizine and Rocephin was started.       PAST MEDICAL HISTORY:   Hypertension  Atrial fibrillation on Xarelto  Hyperlipidemia  Prostate cancer  PAST SURGICAL HISTORY:     Past Surgical History:   Procedure Laterality Date    robotic prostatectomy  12/2013    VARICOSE VEIN SURGERY         ALLERGIES:   Patient has no known allergies.    FAMILY HISTORY:   Mother:  Cardiovascular disease  Father:  Esophageal cancer    SOCIAL HISTORY:   Tobacco - Denies  Alcohol - 4 beers on the weekend  Illicit Drugs - Denies    HOME MEDICATIONS:     Prior to Admission medications    Medication Sig Start Date End Date Taking? Authorizing Provider   amiodarone (PACERONE) 200 MG Tab Take by mouth 2 (two) times daily.    Historical Provider   apixaban (ELIQUIS) 5 mg Tab Take 5 mg by mouth 2 (two) times daily.    Historical Provider   bimatoprost (LUMIGAN) 0.03 % ophthalmic drops 1 drop every evening.    Historical Provider   dorzolamide-timolol 2-0.5% (COSOPT) 22.3-6.8 mg/mL ophthalmic solution 1 drop. 12/5/13   Historical Provider   erythromycin (ROMYCIN) ophthalmic ointment every evening.    Historical Provider   irbesartan (AVAPRO) 300 MG tablet  5/27/19   Historical Provider   lisinopril-hydrochlorothiazide (PRINZIDE,ZESTORETIC) 20-12.5 mg per tablet Take 1 tablet by mouth once daily.    Historical Provider   lovastatin (MEVACOR) 40 MG tablet Take 40 mg by mouth every evening.    Historical Provider       REVIEW OF SYSTEMS:   Except as documented, all other systems reviewed and negative     PHYSICAL EXAM:     VITAL  SIGNS: 24 HRS MIN & MAX LAST   Temp  Min: 97 °F (36.1 °C)  Max: 97 °F (36.1 °C) 97 °F (36.1 °C)   BP  Min: 96/65  Max: 113/71 108/63   Pulse  Min: 48  Max: 68  (!) 48   Resp  Min: 13  Max: 25 18   SpO2  Min: 91 %  Max: 98 % 96 %       General appearance: Well-developed, well-nourished male in no apparent distress. Wife at bedside.  HEENT: Atraumatic head. Moist mucous membranes of oral cavity. Strabismus.  Lungs: Clear to auscultation bilaterally.   Heart: Bradycardic rate and rhythm. 2+ pitting edema to BLE  Abdomen: Soft, non-distended, non-tender. Bowel sounds are normal.   Extremities: No cyanosis, clubbing. No deformities.  Skin: No Rash. Warm and dry.  Neuro: Awake, alert and oriented. Motor and sensory exams grossly intact.  Psych/mental status: Appropriate mood and affect. Cooperative. Responds appropriately to questions.       LABS AND IMAGING:     Recent Labs   Lab 06/25/22  0709   WBC 23.0*   RBC 4.00*   HGB 12.5*   HCT 37.9*   MCV 94.8*   MCH 31.3*   MCHC 33.0   RDW 15.5      MPV 11.1       Recent Labs   Lab 06/25/22  0709      K 3.8   CO2 23   BUN 23.0   CREATININE 1.59*   CALCIUM 8.7*   ALBUMIN 2.3*   ALKPHOS 93   ALT 78*   AST 56*   BILITOT 2.3*       Microbiology Results (last 7 days)       Procedure Component Value Units Date/Time    Blood culture #1 **CANNOT BE ORDERED STAT** [[id number]] Collected: 06/25/22 1023    Order Status: Resulted Specimen: Blood Updated: 06/25/22 1023    Blood culture #2 **CANNOT BE ORDERED STAT** [[id number]] Collected: 06/25/22 1018    Order Status: Resulted Specimen: Blood Updated: 06/25/22 1023             X-Ray Foot Complete Right  Narrative: EXAMINATION:  Right foot    CLINICAL HISTORY:  Pain    TECHNIQUE:  Three-view    COMPARISON:  None available    FINDINGS:  Osseous and articular structures are unremarkable. There is no fracture, subluxation or arthritic change. Alignment and position are moderate size calcaneal enthesophyte.  No soft tissue  calcifications are noted.  Impression: Calcaneal enthesophyte.    Electronically signed by: Chris Bro  Date:    06/25/2022  Time:    14:19  X-Ray Chest AP Portable  Narrative: EXAMINATION:  XR CHEST AP PORTABLE    CLINICAL HISTORY:  Weakness    TECHNIQUE:  Single frontal view of the chest was performed.    COMPARISON:  None    FINDINGS:  Film is rotated.  There is blunting of the very lateral portion of the right costophrenic angle.  There are mild atelectatic changes in the left lung base.  Heart is borderline in size.  Impression: Changes as described above, no acute infiltrates are seen    Electronically signed by: Ton Adams MD  Date:    06/25/2022  Time:    07:48  CT Head Without Contrast  Narrative: Technique:CT of the head was performed without intravenous contrast with axial as well as coronal and sagittal images.    Comparison:None.    Dosage Information:Automated exposure control was utilized.      Clinical history:Dizziness x1 month, worse today.    Findings:    Hemorrhage:No acute intracranial hemorrhage is seen.    CSF spaces:The ventricles, sulci and basal cisterns all appear moderately prominent global cerebral atrophy.    Brain parenchyma:Mild microvascular change is seen in portions of the periventricular and deep white matter tracts.    Cerebellum:Unremarkable.    Sella and skull base:The sella appears to be within normal limits for age.    Calvarium:No acute linear or depressed skull fracture is seen.    Maxillofacial Structures:    Orbits:Left phthisis bulbi.    Zygomatic arches:The zygomatic arches are intact and unremarkable.    Temporal bones and mastoids:The temporal bones and mastoids appear unremarkable.    TMJ:The mandibular condyles appear normally placed with respect to the mandibular fossa.  Impression: Impression:    No acute intracranial process identified. Details and findings as noted above.    No significant discrepancy with overnight report.    No significant  discrepancy with overnight report    Electronically signed by: Chris Bro  Date:    06/25/2022  Time:    07:42        ASSESSMENT & PLAN:   Assessment:  Dizziness ?  vertigo   Leukocytosis etiology unknown  Macrocytic anemia, stable  CKD stage III  Elevated BNP ? CHF exacerbation  Atrial fibrillation, rate controlled on Eliquis  History of prostate cancer    Plan:  - Continue with Rocephin  - Follow results of blood and urine cultures adjusting antibiotics as indicated  - Orthostatic vitals ordered  - Echo ordered to look for LVEF and wall abnormalities  - Free T4 1.47. Free T3 ordered  - Resume approbate home medications  - Labs in AM        VTE Prophylaxis: will be placed on Lovenox for DVT prophylaxis and will be advised to be as mobile as possible and sit in a chair as tolerated      __________________________________________________________________________  INPATIENT LIST OF MEDICATIONS     Current Facility-Administered Medications:     0.9%  NaCl infusion, , Intravenous, Continuous, Lauren Alanis PA-C, Last Rate: 75 mL/hr at 06/25/22 1500, New Bag at 06/25/22 1500    acetaminophen tablet 650 mg, 650 mg, Oral, Q8H PRN, Lauren Alanis PA-C    acetaminophen tablet 650 mg, 650 mg, Oral, Q4H PRN, JHONY Meadows-ANNEMARIE    dextrose 10% bolus 125 mL, 12.5 g, Intravenous, PRN, JHONY Meadows-ANNEMARIE    dextrose 10% bolus 250 mL, 25 g, Intravenous, PRN, Lauren Alanis PA-C    enoxaparin injection 40 mg, 40 mg, Subcutaneous, Daily, Lauren Alanis PA-C    glucagon (human recombinant) injection 1 mg, 1 mg, Intramuscular, PRN, Lauren Alanis PA-C    glucose chewable tablet 16 g, 16 g, Oral, PRN, JHONY Meadows-ANNEMARIE    glucose chewable tablet 24 g, 24 g, Oral, PRN, Lauren Alanis PA-C    ondansetron injection 4 mg, 4 mg, Intravenous, Q4H PRN, Lauren Alanis PA-C    sodium chloride 0.9% flush 10 mL, 10 mL, Intravenous, Q12H PRN, Lauren Alanis PA-C    Current Outpatient Medications:      amiodarone (PACERONE) 200 MG Tab, Take by mouth 2 (two) times daily., Disp: , Rfl:     apixaban (ELIQUIS) 5 mg Tab, Take 5 mg by mouth 2 (two) times daily., Disp: , Rfl:     bimatoprost (LUMIGAN) 0.03 % ophthalmic drops, 1 drop every evening., Disp: , Rfl:     dorzolamide-timolol 2-0.5% (COSOPT) 22.3-6.8 mg/mL ophthalmic solution, 1 drop., Disp: , Rfl:     erythromycin (ROMYCIN) ophthalmic ointment, every evening., Disp: , Rfl:     irbesartan (AVAPRO) 300 MG tablet, , Disp: , Rfl:     lisinopril-hydrochlorothiazide (PRINZIDE,ZESTORETIC) 20-12.5 mg per tablet, Take 1 tablet by mouth once daily., Disp: , Rfl:     lovastatin (MEVACOR) 40 MG tablet, Take 40 mg by mouth every evening., Disp: , Rfl:       Scheduled Meds:   enoxaparin  40 mg Subcutaneous Daily     Continuous Infusions:   sodium chloride 0.9% 75 mL/hr at 06/25/22 1500     PRN Meds:.acetaminophen, acetaminophen, dextrose 10%, dextrose 10%, glucagon (human recombinant), glucose, glucose, ondansetron, sodium chloride 0.9%      Discharge Planning and Disposition: Anticipated discharge to be determined.    I, JHONY Yi, have reviewed and discussed the case with Dr. Jm Key MD    Please see the following addendum for further assessment and plan from there attending MD.    Lauren Alanis PA-C  06/25/2022    ________________________________________________________________________________    MD Addendum:  I, Dr. Jm Key MD assumed care of this patient today at ---am/pm  For the patient encounter, I performed the substantive portion of the visit, I reviewed the NP/PA documentation, treatment plan, and medical decision making.  I had face to face time with this patient     A. History:  Patient with dizziness, fatigue, and night sweats for several days.  Third ED visit without finding cuase. Today with leukocytosis.  CXR unrevealing.  UA clean.  Started empirically on Rocephin.       B. Physical exam:  Gen: NC AT, Awake, alert, and oriented  x4  HEENT: PERRL EOMI normal conjunctiva, neck supple  Cardiac: Regular rhythm and rate, no murmur, rubs, or gallops  Respiratory: Clear to auscultation bilaterally. No wheezes, rales, or crackles  Gastrointestinal: soft, nondistended, nontender, +bowel sounds  Musculoskeletal: Normal ROM, no pertinent deformities  Neuro: no focal deficits noted, speech clear  Psych: appropriate mood/affect    C. Medical decision making:  Leucocytosis without k nown infectious source  COVID negative. Mildly elevated transaminases so wouldn't expect viral hepatitis. Still will check viral panel and US.  OK to continue Rocephin for now but de-escalate quickly  Continue home meds    All diagnosis and differential diagnosis have been reviewed; assessment and plan has been documented; I have personally reviewed the labs and test results that are presently available; I have reviewed the patients medication list; I have reviewed the consulting providers response and recommendations. I have reviewed or attempted to review medical records based upon their availability.    All of the patient and family questions have been addressed and answered. Patient's is agreeable to the above stated plan. I will continue to monitor closely and make adjustments to medical management as needed.      Jm Key MD  06/25/2022

## 2022-06-25 NOTE — ED PROVIDER NOTES
Encounter Date: 6/25/2022    SCRIBE #1 NOTE: I, Diana Angulo, am scribing for, and in the presence of,  Lazaro Ibrahim. I have scribed the entire note.       History     Chief Complaint   Patient presents with    Dizziness     Complaint of dizzyness x 1 month.  Very nauseated.  States dizziness became worse tonight.  States waiting on lab results from Dr. Minaya.  Was also seen at the [facility].       76 y/o male with hx of A-fib, hypertension presents to the ED due to intermittent dizziness starting last night. Pt reports nausea, vomiting, chills, and diaphoresis starting last night.  Symptoms worsened since onset.  No mitigating factors reported.  Pt was previously in the ED on June 11th and 15th.  He denies any SOB, diarrhea, SZYMANSKI, or CP. Pt denies any falls or trauma.  Patient reportedly too weak to stand or walk now.    The history is provided by the patient.   Dizziness  This is a recurrent problem. The current episode started yesterday. The problem has been gradually worsening. Pertinent negatives include no chest pain, no abdominal pain, no headaches and no shortness of breath. Nothing aggravates the symptoms. Nothing relieves the symptoms.     Review of patient's allergies indicates:  No Known Allergies  Past Medical History:   Diagnosis Date    Heart murmur     Hypertension     OAB (overactive bladder)     Prostate cancer      Past Surgical History:   Procedure Laterality Date    robotic prostatectomy  12/2013    VARICOSE VEIN SURGERY       No family history on file.  Social History     Tobacco Use    Smoking status: Never Smoker    Smokeless tobacco: Never Used   Substance Use Topics    Alcohol use: No    Drug use: No     Review of Systems   Constitutional: Positive for chills and diaphoresis. Negative for appetite change and fever.   HENT: Negative for congestion and sore throat.    Eyes: Negative for pain and visual disturbance.   Respiratory: Negative for cough and shortness of breath.     Cardiovascular: Negative for chest pain.   Gastrointestinal: Positive for nausea and vomiting. Negative for abdominal pain and diarrhea.   Genitourinary: Negative for dysuria and hematuria.   Musculoskeletal:        R foot pain.    Skin: Negative for rash and wound.   Allergic/Immunologic: Negative for food allergies and immunocompromised state.   Neurological: Positive for dizziness. Negative for seizures, syncope, weakness and headaches.       Physical Exam     Initial Vitals [06/25/22 0455]   BP Pulse Resp Temp SpO2   113/71 68 20 97 °F (36.1 °C) (!) 94 %      MAP       --         Physical Exam    Nursing note and vitals reviewed.  Constitutional: He appears well-developed and well-nourished. He is not diaphoretic. He does not appear ill. No distress.   HENT:   Head: Normocephalic and atraumatic.   Mouth/Throat: Oropharynx is clear and moist.   Eyes: Conjunctivae and EOM are normal. Pupils are equal, round, and reactive to light.   L eye inferior pupillary defect with no response to light  R eye abnormal pupil with minimal response to light   Neck: Neck supple.   Normal range of motion.  Cardiovascular: Normal rate, regular rhythm, normal heart sounds and intact distal pulses.   No murmur heard.  2+ pitting edema to the bilateral lower extremities   Pulmonary/Chest: Breath sounds normal. No respiratory distress. He has no wheezes. He has no rales. He exhibits no tenderness.   Abdominal: Abdomen is soft. Bowel sounds are normal. He exhibits no distension. There is no abdominal tenderness.   Musculoskeletal:         General: No tenderness or edema.      Cervical back: Normal range of motion and neck supple.      Lumbar back: Normal. No tenderness. Normal range of motion.      Comments: Mild tenderness to palpation of the dorsal aspect of the right midfoot.  No effusion or erythema noted.  No tenderness to palpation of digits, or any medial or lateral malleolus tenderness to palpation.  No joint effusion.      Neurological: He is alert. No cranial nerve deficit or sensory deficit. GCS score is 15. GCS eye subscore is 4. GCS verbal subscore is 5. GCS motor subscore is 6.   4/5 strength throughout.   Skin: Skin is warm and dry. Capillary refill takes less than 2 seconds. No rash noted. No erythema.   No skin wounds noted.   Psychiatric: He has a normal mood and affect. His mood appears not anxious.         ED Course   Procedures  Labs Reviewed   COMPREHENSIVE METABOLIC PANEL - Abnormal; Notable for the following components:       Result Value    Chloride 108 (*)     Glucose Level 143 (*)     Creatinine 1.59 (*)     Calcium Level Total 8.7 (*)     Protein Total 5.1 (*)     Albumin Level 2.3 (*)     Albumin/Globulin Ratio 0.8 (*)     Bilirubin Total 2.3 (*)     Alanine Aminotransferase 78 (*)     Aspartate Aminotransferase 56 (*)     All other components within normal limits   B-TYPE NATRIURETIC PEPTIDE - Abnormal; Notable for the following components:    Natriuretic Peptide 397.9 (*)     All other components within normal limits   PROTIME-INR - Abnormal; Notable for the following components:    PT 19.2 (*)     INR 1.64 (*)     All other components within normal limits   APTT - Abnormal; Notable for the following components:    PTT 36.3 (*)     All other components within normal limits   URINALYSIS, REFLEX TO URINE CULTURE - Abnormal; Notable for the following components:    Color, UA Dark Yellow (*)     Protein, UA Trace (*)     Blood, UA 3+ (*)     Bilirubin, UA 1+ (*)     Urobilinogen, UA 2.0 (*)     Leukocyte Esterase, UA Trace (*)     All other components within normal limits   CBC WITH DIFFERENTIAL - Abnormal; Notable for the following components:    WBC 23.0 (*)     RBC 4.00 (*)     Hgb 12.5 (*)     Hct 37.9 (*)     MCV 94.8 (*)     MCH 31.3 (*)     IG# 0.30 (*)     IG% 1.3 (*)     All other components within normal limits   TSH - Abnormal; Notable for the following components:    Thyroid Stimulating Hormone 0.2231  (*)     All other components within normal limits   MANUAL DIFFERENTIAL - Abnormal; Notable for the following components:    Abs Lymp 0.23 (*)     Abs Neut 22.31 (*)     All other components within normal limits   URINALYSIS, MICROSCOPIC - Abnormal; Notable for the following components:    RBC, UA 15 (*)     All other components within normal limits   TROPONIN I - Normal   LIPASE - Normal   COVID/FLU A&B PCR - Normal   T4, FREE - Normal   LACTIC ACID, PLASMA - Normal   BLOOD CULTURE [facility]   BLOOD CULTURE [facility]   CBC W/ AUTO DIFFERENTIAL    Narrative:     The following orders were created for panel order CBC auto differential.  Procedure                               Abnormality         Status                     ---------                               -----------         ------                     CBC with Differential[[id number]]        Abnormal            Final result               Manual Differential[[id number]]          Abnormal            Final result                 Please view results for these tests on the individual orders.     EKG Readings: (Independently Interpreted)   Initial Reading: No STEMI. Rhythm: Normal Sinus Rhythm. Heart Rate: 65. Ectopy: No Ectopy. Conduction: Normal. ST Segments: Normal ST Segments. T Waves: Normal. Clinical Impression: Normal Sinus Rhythm with PVCs   0453       Imaging Results          X-Ray Foot Complete Right (In process)                CT Head Without Contrast (Final result)  Result time 06/25/22 07:42:36    Final result by Chris Bro MD (06/25/22 07:42:36)                 Impression:    Impression:    No acute intracranial process identified. Details and findings as noted above.    No significant discrepancy with overnight report.    No significant discrepancy with overnight report      Electronically signed by: Chris Bro  Date:    06/25/2022  Time:    07:42             Narrative:      Technique:CT of the head was performed without intravenous contrast with axial as well  as coronal and sagittal images.    Comparison:None.    Dosage Information:Automated exposure control was utilized.      Clinical history:Dizziness x1 month, worse today.    Findings:    Hemorrhage:No acute intracranial hemorrhage is seen.    CSF spaces:The ventricles, sulci and basal cisterns all appear moderately prominent global cerebral atrophy.    Brain parenchyma:Mild microvascular change is seen in portions of the periventricular and deep white matter tracts.    Cerebellum:Unremarkable.    Sella and skull base:The sella appears to be within normal limits for age.    Calvarium:No acute linear or depressed skull fracture is seen.    Maxillofacial Structures:    Orbits:Left phthisis bulbi.    Zygomatic arches:The zygomatic arches are intact and unremarkable.    Temporal bones and mastoids:The temporal bones and mastoids appear unremarkable.    TMJ:The mandibular condyles appear normally placed with respect to the mandibular fossa.                    Preliminary result by Chris Bro MD (06/25/22 06:58:41)                 Narrative:    START OF REPORT:  Technique: CT of the head was performed without intravenous contrast with axial as well as coronal and sagittal images.    Comparison: None.    Dosage Information: Automated exposure control was utilized.    Clinical history: Dizziness x1 month, worse today.    Findings:  Hemorrhage: No acute intracranial hemorrhage is seen.  CSF spaces: The ventricles, sulci and basal cisterns all appear moderately prominent global cerebral atrophy.  Brain parenchyma: Mild microvascular change is seen in portions of the periventricular and deep white matter tracts.  Cerebellum: Unremarkable.  Sella and skull base: The sella appears to be within normal limits for age.  Calvarium: No acute linear or depressed skull fracture is seen.    Maxillofacial Structures:  Orbits: Left phthisis bulbi.  Zygomatic arches: The zygomatic arches are intact and unremarkable.  Temporal  bones and mastoids: The temporal bones and mastoids appear unremarkable.  TMJ: The mandibular condyles appear normally placed with respect to the mandibular fossa.      Impression:  1. No acute intracranial process identified. Details and findings as noted above.                                 X-Ray Chest AP Portable (Final result)  Result time 06/25/22 07:48:40    Final result by Ton Adams MD (06/25/22 07:48:40)                 Impression:      Changes as described above, no acute infiltrates are seen      Electronically signed by: Ton Adams MD  Date:    06/25/2022  Time:    07:48             Narrative:    EXAMINATION:  XR CHEST AP PORTABLE    CLINICAL HISTORY:  Weakness    TECHNIQUE:  Single frontal view of the chest was performed.    COMPARISON:  None    FINDINGS:  Film is rotated.  There is blunting of the very lateral portion of the right costophrenic angle.  There are mild atelectatic changes in the left lung base.  Heart is borderline in size.                                 Medications   cefTRIAXone (ROCEPHIN) 2 g in dextrose 5 % in water (D5W) 5 % 50 mL IVPB (MB+) (has no administration in time range)   meclizine tablet 25 mg (25 mg Oral Given 6/25/22 0645)   lactated ringers bolus 1,000 mL (0 mLs Intravenous Stopped 6/25/22 1130)   traMADoL tablet 100 mg (100 mg Oral Given 6/25/22 1337)     Medical Decision Making:   Clinical Tests:   Lab Tests: Ordered and Reviewed  Radiological Study: Ordered and Reviewed  Medical Tests: Ordered and Reviewed    Patient is a 77-year-old male who presents for generalized weakness, fatigue, dizziness.  See HPI.  See exam is noted.  Diffusely weak, no acute focal neuro deficit appreciated.  Vital signs notable for some mild hypotension, bradycardia but appears to be sinus.  Lab work as noted above.  Marked leukocytosis greater than previous 2 ER visits.  Infectious workup initiated.  No evidence of urinary tract infection, infiltrate on chest x-ray, skin or soft  tissue wounds.  Blood cultures obtained.  Patient given IV fluids.  All results discussed with patient.  Remains profoundly weak.  Will discuss with Hospital Medicine for admission.  For further evaluation management treatment.  All results discussed with patient and family all questions time.  Verbalized understanding agreed to plan.                        I, Lazaro Ibrahim MD, personally performed the services described in the documentation as documented by the scribe in my presence and is both accurate and complete.       Clinical Impression:   Final diagnoses:  [R42] Dizziness (Primary)  [R53.1] Weakness  [R52] Pain  [R42] Vertigo  [R42] Lightheadedness  [D72.829] Leukocytosis, unspecified type  [D72.89] Left-shifted white blood cells          ED Disposition Condition    Admit               Lazaro Ibrahim MD  06/25/22 1735

## 2022-06-26 NOTE — PROGRESS NOTES
"[facility]  Hospital Medicine Progress Note        Chief Complaint: Inpatient Follow-up for fatigue    HPI:   77 y.o. Patient male with a past medical history of atrial fibrillation on Eliquis, hypertension, hyperlipidemia, prostate cancer. The patient presented to [facility] on 6/25/2022 with a primary complaint of dizziness and chills which began last night (06/24/2022).  He also complains of a "bad taste" in his mouth. He denies complaints chest pain, shortness of breath, headache, vision changes, focal weakness, cough, abdominal pain, nausea, vomiting, diarrhea.  Patient has been seen in the ED several times this month.  He was seen on at [facility] on 6/11 and 6/15. He was told to be hypotensive and given IV fluids.  COVID-19 and flu were negative.  Liver enzymes were elevated and appointment was scheduled with Dr. Sorenson. Patient reports Dr. Sorenson scheduled an ultrasound for next week.  In addition patient was taken off of his amiodarone and pravastatin.  Patient's cardiologist is Dr. ballesteros.     Upon presentation to the ED, patient afebrile, normotensive, heart rate 68 and SpO2 94% on room air. Heart rate decreased to 48. Labs notable for WBC 23, stable macrocytic anemia, BUN/creatinine 23/1.59 (20/1.54 in September 2021), , troponin 0.044, TSH .223.  UA with 15 rbc's, trace leukocyte, 3+ blood, trace protein. EKG sinus rhythm with occasional premature ventricular complexes and nonspecific ST abnormalities.  Chest x-ray with blunting of the lateral portion of the right costophrenic angle and mild atelectatic changes in the left lung base.  CT of the head negative for acute intracranial findings.  In the ED patient received meclizine and Rocephin was started.       Interval Hx:  Patient states overall is feeling better this morning.  No fevers or chills overnight.  Vital signs are stable.  She is able to rest some.  On complaints of neck discomfort and " asking for an extra pillow.  Has not tried to eat anything this morning.  He was tolerating p.o. yesterday.  Discussed results of his liver ultrasound which was unrevealing.  Denies any previous history of thyroid disorder    Objective/physical exam:  General: In no acute distress, afebrile, no neck abnormalities.  No thyroid tenderness or nodules noted   Chest: Clear to auscultation bilaterally  Heart: RRR, +S1, S2, no appreciable murmur  Abdomen: Soft, nontender, BS +  MSK: Warm, no lower extremity edema, no clubbing or cyanosis  Neurologic: Alert and oriented x4, Cranial nerve II-XII intact, Strength 5/5 in all 4 extremities    VITAL SIGNS: 24 HRS MIN & MAX LAST   Temp  Min: 97.5 °F (36.4 °C)  Max: 97.9 °F (36.6 °C) 97.5 °F (36.4 °C)   BP  Min: 96/65  Max: 130/82 130/82   Pulse  Min: 48  Max: 63  (!) 56   Resp  Min: 13  Max: 25 20   SpO2  Min: 93 %  Max: 98 % 96 %       Recent Labs   Lab 06/25/22  0709 06/26/22  0441   WBC 23.0* 12.9*   RBC 4.00* 4.03*   HGB 12.5* 12.3*   HCT 37.9* 39.7*   MCV 94.8* 98.5*   MCH 31.3* 30.5   MCHC 33.0 31.0*   RDW 15.5 15.7    132   MPV 11.1 11.1       Recent Labs   Lab 06/25/22  0709 06/26/22  0441    137   K 3.8 3.6   CO2 23 20*   BUN 23.0 22.1   CREATININE 1.59* 1.01   CALCIUM 8.7* 8.5*   ALBUMIN 2.3* 2.1*   ALKPHOS 93 79   ALT 78* 68*   AST 56* 49*   BILITOT 2.3* 1.2          Microbiology Results (last 7 days)     Procedure Component Value Units Date/Time    Blood culture #1 **CANNOT BE ORDERED STAT** [[id number]] Collected: 06/25/22 1023    Order Status: Resulted Specimen: Blood Updated: 06/25/22 1023    Blood culture #2 **CANNOT BE ORDERED STAT** [[id number]] Collected: 06/25/22 1018    Order Status: Resulted Specimen: Blood Updated: 06/25/22 1023           See below for Radiology    Scheduled Med:   amiodarone  200 mg Oral BID    apixaban  5 mg Oral BID    enoxaparin  40 mg Subcutaneous Daily    losartan  100 mg Oral Daily        Continuous Infusions:    sodium chloride 0.9% 75 mL/hr at 06/25/22 2227        PRN Meds:  acetaminophen, acetaminophen, dextrose 10%, dextrose 10%, glucagon (human recombinant), glucose, glucose, HYDROcodone-acetaminophen, ondansetron, sodium chloride 0.9%       Assessment/Plan:   Dizziness ?  vertigo   Leukocytosis etiology unknown  Macrocytic anemia, stable  CKD stage III  Elevated BNP ? CHF exacerbation  Atrial fibrillation, rate controlled on Eliquis  History of prostate cancer     Still unsure of the source of patient's leukocytosis.  He was started on empiric Rocephin yesterday and white count has dropped down to near normal today.  No fevers chills or night sweats.  Original urinalysis in the ER was negative but nursing at to in and out overnight and reported foul odor.  Patient evidently with some history of prostate cancer and urinary retention.  If needs to be catheterized again today will place a Ashley catheter and recheck urinalysis.  Vital signs have been stable.  Pulse low normal and asymptomatic.  Right upper quadrant ultrasound was unrevealing.  His AST and ALT have come down slightly.  A follow-up acute hepatitis panel another possible viral etiologies.  Noted low TSH.  No history of thyroid disorder.  Free T4 was within normal limits.  Likely just some subclinical hyperthyroidism.  No planned intervention.  T3 was mildly depressed but can be followed up as an outpatient.  Will get him up with PT and OT today to evaluate his weakness.  Will continue with supportive care for now    Patient condition:  Stable    Anticipated discharge and Disposition: TBD      All diagnosis and differential diagnosis have been reviewed; assessment and plan has been documented; I have personally reviewed the labs and test results that are presently available; I have reviewed the patients medication list; I have reviewed the consulting providers response and recommendations. I have reviewed or attempted to review medical records based upon their  availability    All of the patient's questions have been  addressed and answered. Patient's is agreeable to the above stated plan. I will continue to monitor closely and make adjustments to medical management as needed.  _____________________________________________________________________      Radiology:  US Abdomen Limited_Liver  Narrative: EXAMINATION:  US ABDOMEN LIMITED_LIVER    CLINICAL HISTORY:  hepatitis;    TECHNIQUE:  Grayscale and color Doppler images of the abdomen are obtained.    COMPARISON:  None    FINDINGS:  The pancreas is not well visualized due to overlying bowel gas.  The aorta is nonaneurysmal.  The IVC is patent.    The liver is diffusely hyperechogenic and enlarged measuring 15.8 cm.  There is a 2.8 x 1.6 x 2.3 cm simple cyst in the right hepatic lobe.  There is no biliary dilatation.  The main portal vein is patent with hepatopetal flow.  The CBD measures 5 mm.  The gallbladder is not distended.  There is no gallbladder wall thickening or pericholecystic fluid.  There is no cholelithiasis.  There is no sonographic King sign.    The right kidney measures 12.5 cm.  There is mild caliceal prominence and dilation of the right renal pelvis with no point of obstruction identified.  Impression: 1. Mild right hydronephrosis with no point of obstruction identified on this exam.  2. Hepatomegaly and diffuse hepatic steatosis.    Electronically signed by: Terrell Kendrick MD  Date:    06/25/2022  Time:    20:16  X-Ray Foot Complete Right  Narrative: EXAMINATION:  Right foot    CLINICAL HISTORY:  Pain    TECHNIQUE:  Three-view    COMPARISON:  None available    FINDINGS:  Osseous and articular structures are unremarkable. There is no fracture, subluxation or arthritic change. Alignment and position are moderate size calcaneal enthesophyte.  No soft tissue calcifications are noted.  Impression: Calcaneal enthesophyte.    Electronically signed by: Chris Bro  Date:    06/25/2022  Time:    14:19  X-Ray Chest  AP Portable  Narrative: EXAMINATION:  XR CHEST AP PORTABLE    CLINICAL HISTORY:  Weakness    TECHNIQUE:  Single frontal view of the chest was performed.    COMPARISON:  None    FINDINGS:  Film is rotated.  There is blunting of the very lateral portion of the right costophrenic angle.  There are mild atelectatic changes in the left lung base.  Heart is borderline in size.  Impression: Changes as described above, no acute infiltrates are seen    Electronically signed by: Ton Adams MD  Date:    06/25/2022  Time:    07:48  CT Head Without Contrast  Narrative: Technique:CT of the head was performed without intravenous contrast with axial as well as coronal and sagittal images.    Comparison:None.    Dosage Information:Automated exposure control was utilized.      Clinical history:Dizziness x1 month, worse today.    Findings:    Hemorrhage:No acute intracranial hemorrhage is seen.    CSF spaces:The ventricles, sulci and basal cisterns all appear moderately prominent global cerebral atrophy.    Brain parenchyma:Mild microvascular change is seen in portions of the periventricular and deep white matter tracts.    Cerebellum:Unremarkable.    Sella and skull base:The sella appears to be within normal limits for age.    Calvarium:No acute linear or depressed skull fracture is seen.    Maxillofacial Structures:    Orbits:Left phthisis bulbi.    Zygomatic arches:The zygomatic arches are intact and unremarkable.    Temporal bones and mastoids:The temporal bones and mastoids appear unremarkable.    TMJ:The mandibular condyles appear normally placed with respect to the mandibular fossa.  Impression: Impression:    No acute intracranial process identified. Details and findings as noted above.    No significant discrepancy with overnight report.    No significant discrepancy with overnight report    Electronically signed by: Chris Bro  Date:    06/25/2022  Time:    07:42      Jm Key MD   06/26/2022

## 2022-06-27 PROBLEM — I61.4 CEREBELLAR HEMORRHAGE, ACUTE: Status: ACTIVE | Noted: 2022-01-01

## 2022-06-27 NOTE — ASSESSMENT & PLAN NOTE
Cerebellar hemorrhage    CT head 6/27/22  1. Cerebellar parenchymal hemorrhage with subarachnoid hemorrhage in the posterior fossa and basal cisterns.  2. Mass effect in the posterior fossa with effacement of the 4th ventricle and minimal increase in size of the lateral ventricles.  3. Small right frontal lobe parenchymal hemorrhage.  -Recommend SBP less than 140  -avoid anticoagulation and antiplatelet medications  -consult neurosurgery  -prognosis guarded (age, posterior fossa, intraventricular involvement.)  -Q1 hour neuro checks

## 2022-06-27 NOTE — PROCEDURES
[facility]   Endotracheal Intubation  Procedure Note    SUMMARY     Date of Procedure: 6/27/2022    Procedure: Endotracheal Intubation    Perfomed by: Alireza Encinas DO        Indication: Airway Protection    Pre-Procedure Diagnosis: Cerebellar Hemorrhage     Post-Procedure Diagnosis: Cerebellar Hemorrhage         Description of the Findings of the Procedure:     Sedation: Propofol   Paralytic: None  Lidocaine: No  Atropine: No  Equipment: 7.5 ET tube, ambu-bag, suction  Cricoid Pressure: No  Number of attempts: 1  ETT location confirmed by CXR        Complications: None, patient tolerated procedure well       Condition: Good        Disposition: Hemodynamically stable    Alireza Encinas DO  [employer] IM PGY-3

## 2022-06-27 NOTE — PLAN OF CARE
Problem: Occupational Therapy  Goal: Occupational Therapy Goal  Description: Pt to perform UB dressing with Mod I by d/c.   Pt to perform LB dressing with Mod I using AD by d/c.   Pt to perform self toileting with Mod I using RW and grab bars by d/c.  Pt to demonstrate Mod I dynamic standing balance for 10 minutes as required to perform ADL's from standing level.  Pt to demonstrate 5/5 BUE strength during functional task     Outcome: Ongoing, Progressing

## 2022-06-27 NOTE — PT/OT/SLP EVAL
Occupational Therapy   Evaluation    Name: Jose Nieves  MRN: [id number]  Admitting Diagnosis:  <principal problem not specified>  Recent Surgery: * No surgery found *      Recommendations:     Discharge Recommendations: rehabilitation facility, other (see comments) (swing bed)  Discharge Equipment Recommendations:  walker, rolling  Barriers to discharge:  Other (Comment) (motor, fall risk)    Assessment:     Jose Nieves is a 77 y.o. male with a medical diagnosis of <principal problem not specified>.  He presents with dx of dizziness, CHF, vertigo, weakness, R foot pain, leukocytosis. Performance deficits affecting function: weakness, impaired endurance, impaired self care skills, impaired functional mobilty, gait instability, impaired balance, visual deficits, decreased lower extremity function, pain.      Rehab Prognosis: Good; patient would benefit from acute skilled OT services to address these deficits and reach maximum level of function.       Plan:     Patient to be seen 3 x/week, 5 x/week to address the above listed problems via self-care/home management, therapeutic activities, therapeutic exercises  · Plan of Care Expires: 07/01/22 (re-eval this date)  · Plan of Care Reviewed with: patient, spouse    Subjective     Chief Complaint: R foot pain   Patient/Family Comments/goals: walk better than I do now    Occupational Profile:  Living Environment: 1-level home, 1 step to enter. Tub/shower combo, no AD  Previous level of function: Ind ADLs, independent for most IADLS-doesn't drive d/t low vision  Roles and Routines: self-care, med management, retired  Equipment Used at Home:  cane, straight  Assistance upon Discharge: pt's wife is at home, cannot physically assist    Pain/Comfort:  · Pain Rating 1: 6/10  · Location - Side 1: Right  · Location 1: foot  · Pain Addressed 1: Reposition, Distraction    Patients cultural, spiritual, Voodoo conflicts given the current situation:      Objective:      Communicated with: nrsg prior to session.  Patient found HOB elevated with peripheral IV upon OT entry to room.    General Precautions: Standard, fall   Orthopedic Precautions:    Braces: N/A  Respiratory Status: Room air    Occupational Performance:    Bed Mobility:    · Patient completed Supine to Sit with minimum assistance    Functional Mobility/Transfers:  · Patient completed Sit <> Stand Transfer with moderate assistance  with  rolling walker   · Patient completed Toilet Transfer Step Transfer technique with minimum assistance with  rolling walker and grab bars  · Functional Mobility: pt mobilized ~20 ft t/o session, Min A for balance. Pt very fatigued at end, requiring cues to slow steps and increase hip flexion to avoid shuffling.     Activities of Daily Living:  · Lower Body Dressing: maximal assistance sock and shoe management  · Toileting: minimum assistance +void    Cognitive/Visual Perceptual:  Cognitive/Psychosocial Skills:     -WFL    Physical Exam:  Balance:    -       Poor/poor-  Upper Extremity Range of Motion:     -       Right Upper Extremity: WFL  -       Left Upper Extremity: WFL  Upper Extremity Strength:    -       Right Upper Extremity: grossly 3+/5  -       Left Upper Extremity: grossly 3+/5   Strength:    -       Right Upper Extremity: WFL  -       Left Upper Extremity: WFL    AMPAC 6 Click ADL:  AMPAC Total Score: 15    Treatment & Education:  As named above. edu pt on d/c recommendations, JUAN ALBERTO, fall prevention and to call for assistance to mobilize. Pt verbalizes understanding  Education:    Patient left up in chair with all lines intact and call button in reach    GOALS:   Multidisciplinary Problems     Occupational Therapy Goals        Problem: Occupational Therapy    Goal Priority Disciplines Outcome Interventions   Occupational Therapy Goal     OT, PT/OT Ongoing, Progressing    Description: Pt to perform UB dressing with Mod I by d/c.   Pt to perform LB dressing with Mod I  using AD by d/c.   Pt to perform self toileting with Mod I using RW and grab bars by d/c.  Pt to demonstrate Mod I dynamic standing balance for 10 minutes as required to perform ADL's from standing level.  Pt to demonstrate 5/5 BUE strength during functional task                      History:     Past Medical History:   Diagnosis Date    Heart murmur     Hypertension     OAB (overactive bladder)     Prostate cancer        Past Surgical History:   Procedure Laterality Date    robotic prostatectomy  12/2013    VARICOSE VEIN SURGERY         Time Tracking:     OT Date of Treatment: 06/27/22  OT Start Time: 0840  OT Stop Time: 0912  OT Total Time (min): 32 min    Billable Minutes:Evaluation 20  Self Care/Home Management 12    6/27/2022

## 2022-06-27 NOTE — PT/OT/SLP PROGRESS
Physical Therapy      Patient Name:  Jose Nieves   MRN:  [id number]    Patient not seen today secondary to Patient unwilling to participate. Upon PT arrival pt with cold rag on forehead complaining of a headache. RN notified of pt's symptoms. Pt requesting to rest at this time. Will follow-up as schedule permits.

## 2022-06-27 NOTE — SUBJECTIVE & OBJECTIVE
Past Medical History:   Diagnosis Date    Heart murmur     Hypertension     OAB (overactive bladder)     Prostate cancer        Past Surgical History:   Procedure Laterality Date    robotic prostatectomy  12/2013    VARICOSE VEIN SURGERY         Review of patient's allergies indicates:  No Known Allergies    Current Neurological Medications:     No current facility-administered medications on file prior to encounter.     Current Outpatient Medications on File Prior to Encounter   Medication Sig    amiodarone (PACERONE) 200 MG Tab Take by mouth 2 (two) times daily.    apixaban (ELIQUIS) 5 mg Tab Take 5 mg by mouth 2 (two) times daily.    bimatoprost (LUMIGAN) 0.03 % ophthalmic drops 1 drop every evening.    dorzolamide-timolol 2-0.5% (COSOPT) 22.3-6.8 mg/mL ophthalmic solution 1 drop.    erythromycin (ROMYCIN) ophthalmic ointment every evening.    irbesartan (AVAPRO) 300 MG tablet     lisinopril-hydrochlorothiazide (PRINZIDE,ZESTORETIC) 20-12.5 mg per tablet Take 1 tablet by mouth once daily.    lovastatin (MEVACOR) 40 MG tablet Take 40 mg by mouth every evening.     Family History    None       Tobacco Use    Smoking status: Never Smoker    Smokeless tobacco: Never Used   Substance and Sexual Activity    Alcohol use: No    Drug use: No    Sexual activity: Not on file     Review of Systems   Unable to perform ROS: Acuity of condition   Objective:     Vital Signs (Most Recent):  Temp: 97.6 °F (36.4 °C) (06/27/22 1115)  Pulse: (!) 36 (06/27/22 1138)  Resp: 18 (06/27/22 1117)  BP: (!) 153/85 (06/27/22 1135)  SpO2: 96 % (06/27/22 1135)   Vital Signs (24h Range):  Temp:  [97.6 °F (36.4 °C)-98.5 °F (36.9 °C)] 97.6 °F (36.4 °C)  Pulse:  [36-58] 36  Resp:  [18] 18  SpO2:  [94 %-97 %] 96 %  BP: (110-157)/() 153/85     Weight: 84 kg (185 lb 3 oz)  Body mass index is 27.43 kg/m².    Physical Exam  Vitals reviewed.   Neurological:      Comments:   Initially able to mumble his name during exam, less responsive over time, he  would open his eyes to sternal rub  Stuporous   Slowed respirations, appeared to be little effort  Left facial droop  Generally weakness, no obvious unilateral weakness     Did not participate in exam             Significant Labs:   Recent Lab Results         06/27/22  0402        Albumin/Globulin Ratio 0.8       Albumin 2.0       Alkaline Phosphatase 81       ALT 63       AST 48       Baso # 0.04       Basophil % 0.4       BILIRUBIN TOTAL 1.2       BUN 18.3       Calcium 8.1       Chloride 110       CO2 23       Creatinine 1.05       eGFR if non African American >60       Eos # 0.16       Eosinophil % 1.7       Globulin, Total 2.5       Glucose 79       Hematocrit 37.6       Hemoglobin 11.9       Immature Grans (Abs) 0.11       Immature Granulocytes 1.2       Lymph # 1.02       LYMPH % 11.0       MCH 30.6       MCHC 31.6       MCV 96.7       Mono # 0.67       Mono % 7.2       MPV 11.3       Neut # 7.3       Neut % 78.5       nRBC 0.0       Platelets 159       Potassium 3.8       PROTEIN TOTAL 4.5       RBC 3.89       RDW 15.9       Sodium 137       WBC 9.3               Significant Imaging: I have reviewed all pertinent imaging results/findings within the past 24 hours.    CT head 6/27/22   FINDINGS:  A parenchymal hemorrhage in the cerebellum measures approximately 2.7 x 5.1 cm and extends into both hemispheres with surrounding edema.  A there is surrounding subarachnoid hemorrhage in the posterior fossa and basal cisterns.  A small parenchymal hemorrhage in the right frontal lobe measures 4 mm (series 2, image 24).     There is mass effect with effacement of the 4th ventricle and crowding at the foramen magnum.  The lateral ventricles have minimally increased in size.  The calvarium and skull base are intact.  There are chronic changes of the left orbit.     Impression:     1. Cerebellar parenchymal hemorrhage with subarachnoid hemorrhage in the posterior fossa and basal cisterns.  2. Mass effect in the posterior  fossa with effacement of the 4th ventricle and minimal increase in size of the lateral ventricles.  3. Small right frontal lobe parenchymal hemorrhage.

## 2022-06-27 NOTE — H&P
[facility] - [facility]  Pulmonary Critical Care Note    Patient Name: Jsoe Nieves  MRN: [id number]  Admission Date: 6/25/2022  Hospital Length of Stay: 2 days  Code Status: Full Code  Attending Provider: Linwood Bangura MD  Primary Care Provider: Leonard Minaya II, MD     Subjective:     HPI:   This is a 77-year old male with a past medical history of atrial fibrillation on Eliquis, CKD Stage III, hypertension, hyperlipidemia, prostate cancer who originally presented on 6/25/2022 with a primary complaint of dizziness and chills. Admitted with diagnosis of dizziness of unknown origin. Workup thus far has been negative, including echocardiogram, which shows EF 55%, Grade II diastolic dysfunction, severe mitral prolapse and estimated PASP  32 mm Hg and CT head w/o contrast showed no acute abnormalities.  Today, the patient was noted to have an abrupt neurological change. He was noted to have acute change in mental status along with weakness. A RRT was called and an emergent CT head was done that showed a cerebellar parenchymal hemorrhage with subarachnoid hemorrhage of the posterior fossa and basal cisterns with mass effect in the posterior fossa.  Patient was brought to the ICU and intubated.    Hospital Course/Significant events:      24 Hour Interval History:      Past Medical History:   Diagnosis Date    Heart murmur     Hypertension     OAB (overactive bladder)     Prostate cancer        Social History     Socioeconomic History    Marital status:    Tobacco Use    Smoking status: Never Smoker    Smokeless tobacco: Never Used   Substance and Sexual Activity    Alcohol use: No    Drug use: No           Objective:     Current Outpatient Medications   Medication Instructions    amiodarone (PACERONE) 200 MG Tab Oral, 2 times daily    bimatoprost (LUMIGAN) 0.03 % ophthalmic drops 1 drop, Nightly    dorzolamide-timolol 2-0.5% (COSOPT) 22.3-6.8 mg/mL ophthalmic solution 1 drop     erythromycin (ROMYCIN) ophthalmic ointment Nightly    irbesartan (AVAPRO) 300 MG tablet No dose, route, or frequency recorded.    lisinopril-hydrochlorothiazide (PRINZIDE,ZESTORETIC) 20-12.5 mg per tablet 1 tablet, Oral, Daily    lovastatin (MEVACOR) 40 mg, Oral, Nightly       Current Inpatient Medications   amiodarone  200 mg Oral BID    cefdinir  300 mg Oral Q12H    chlorhexidine  15 mL Mouth/Throat BID    factor XA, inactivated-ZHZD  800 mg Intravenous Once    Followed by    factor Xa,inactivated-zhzo  8 mg/min Intravenous Once    famotidine (PF)  20 mg Intravenous BID    losartan  100 mg Oral Daily           Intake/Output Summary (Last 24 hours) at 6/27/2022 1325  Last data filed at 6/26/2022 2200  Gross per 24 hour   Intake 240 ml   Output 400 ml   Net -160 ml       Review of Systems   Unable to perform ROS: Intubated        Vital Signs (Most Recent):  Temp: 97.6 °F (36.4 °C) (06/27/22 1115)  Pulse: (!) 36 (06/27/22 1138)  Resp: 18 (06/27/22 1117)  BP: (!) 153/85 (06/27/22 1135)  SpO2: 96 % (06/27/22 1135)  Body mass index is 27.43 kg/m².  Weight: 84 kg (185 lb 3 oz) Vital Signs (24h Range):  Temp:  [97.6 °F (36.4 °C)-98.5 °F (36.9 °C)] 97.6 °F (36.4 °C)  Pulse:  [36-58] 36  Resp:  [18] 18  SpO2:  [94 %-97 %] 96 %  BP: (110-157)/() 153/85     Physical Exam  Vitals and nursing note reviewed.   Constitutional:       Comments: Patient is intubated but not sedated   HENT:      Head: Normocephalic and atraumatic.   Eyes:      Comments: R and L pupil are 2 mm bilaterally   Cardiovascular:      Rate and Rhythm: Normal rate and regular rhythm.      Pulses: Normal pulses.      Heart sounds: No murmur heard.  Pulmonary:      Effort: Pulmonary effort is normal. No respiratory distress.      Breath sounds: Normal breath sounds.   Abdominal:      General: Abdomen is flat. There is no distension.      Palpations: Abdomen is soft.      Tenderness: There is no abdominal tenderness.   Musculoskeletal:          General: No swelling. Normal range of motion.      Cervical back: Normal range of motion and neck supple. No tenderness.   Skin:     General: Skin is warm and dry.      Capillary Refill: Capillary refill takes less than 2 seconds.      Findings: No erythema.   Neurological:      Comments: Sluggish pupillary reflex noted. Corneal, gag and cough reflex noted. Withdrawal to pain of L lower extremity but does not withdrawal to an other extremities.           Mechanical ventilation support:  Vent Mode: SIMV (06/27/22 1200)  Ventilator Initiated: Yes (06/27/22 1200)  Set Rate: 24 BPM (06/27/22 1200)  Vt Set: 500 mL (06/27/22 1200)  Pressure Support: 10 cmH20 (06/27/22 1200)  PEEP/CPAP: 5 cmH20 (06/27/22 1200)  Oxygen Concentration (%): 60 (06/27/22 1200)  Peak Airway Pressure: 17 cmH2O (06/27/22 1200)  Total Ve: 12.8 mL (06/27/22 1200)     Lines/Drains/Airways     Airway  Duration                Airway - Non-Surgical 06/27/22 1200 Endotracheal Tube <1 day          Peripheral Intravenous Line  Duration                Peripheral IV - Single Lumen 06/25/22 1057 20 G Left Antecubital 2 days         Peripheral IV - Single Lumen 06/27/22 1300 18 G;Other (Comments) Anterior;Left Forearm <1 day                Significant Labs:    Lab Results   Component Value Date    WBC 9.3 06/27/2022    HGB 11.9 (L) 06/27/2022    HCT 37.6 (L) 06/27/2022    MCV 96.7 (H) 06/27/2022     06/27/2022         BMP  Lab Results   Component Value Date     06/27/2022    K 3.8 06/27/2022    CO2 23 06/27/2022    BUN 18.3 06/27/2022    CREATININE 1.05 06/27/2022    CALCIUM 8.1 (L) 06/27/2022    EGFRNONAA >60 06/27/2022       ABG  Recent Labs   Lab 06/27/22  1313   PH 7.46*   PO2 128*   PCO2 29*   HCO3 20.6           Significant Imaging:  I have reviewed all pertinent imaging within the past 24 hours.        Assessment/Plan:     Assessment  1. Cerebellar Parenchymal Hemorrhage with Subarachnoid Hemorrhage of the Posterior Fossa with Mass Effect  causing Effacement of the 4th ventricle  2. Small Right Frontal Lobe Parenchymal Hemorrhage  3. Hx of Atrial Fibrillation, on Eliquis - rate controlled  4. Hx of HTN  5. CKD Stage III  6. Hx of Hyperlidemia  7. Hx of Prostate CA      Plan  1. Have consulted both Vascular Neurology and Neurosurgery. Appreciate their recommendations.  2. Have stopped the patient's eliquis. Have considered Adnexxa but does not seem we have it here. Will give 2U of FFP and a 5-pack cryopreciate.   3. Ongoing discussion with family about CODE STATUS and goals of care. Will re-visit conversation after Neurosurgery and Vascular Neurology sees the patient.    DVT Prophylaxis: None  GI Prophylaxis: Famotidine     Greater than 30 minutes of critical care was time spent personally by me on the following activities: development of treatment plan with patient or surrogate and bedside caregivers, discussions with consultants, evaluation of patient's response to treatment, examination of patient, ordering and performing treatments and interventions, ordering and review of laboratory studies, ordering and review of radiographic studies, pulse oximetry, re-evaluation of patient's condition.  This critical care time did not overlap with that of any other provider or involve time for any procedures.     Jeremías Ellis MD, PGY-III  Pulmonary Critical Care Medicine  [facility]

## 2022-06-27 NOTE — HPI
77 year old male with a past medical history of A Fib on Eliquis, HTN, HLD, and prostate CA presented to ED on 6/24 for dizziness and chills. CT head was negative on 6/25. He reported dizziness was on going for 1 month but was exponentially worse on 6/24. He also had associated nausea. Per EMR symptoms seemed to be improving and he seemed to be progressing well, he complained of neck pain yesterday and was asking for an extra pillow. On 6/27, an RRT was called for acute neurological change. Upon arrival to patients room, he appeared obtunded, he did briefly smile with left facial droop, did not participate in exam, upon arrival to ED, he became stuporous and more difficult to arouse. Nursing reported last known normal around 11:00 am. CT head showed large cerebellar hemorrhage with SAH in the posterior fossa and basal cisterns, mass effect in the posterior fossa with effacement of the 4th ventricle. He was sent to ICU and intubated for airway protection. Discussed with neurology on call who recommended reversal of Eliquis and neurosurgery consult. Vascular neurology was consulted for code FAST/stroke alert.     Of note, he has been seen several times at [facility] on 6/11 and 6/15, he was hypotensive and got IVF and was sent home. He had elevated liver enzymes and was being worked up outpatient (sees Dr. Sorenson).     Stroke alert called at 11:33  Stroke NP responded at 11:35

## 2022-06-27 NOTE — PLAN OF CARE
[facility]  Pulmonary Critical Care Note    Patient Name: Jose Nieves  MRN: [id number]  Admission Date: 6/25/2022  Hospital Length of Stay: 2 days  Code Status: DNR  Attending Provider: Linwood Bangura MD  Primary Care Provider: Leonard Minaya II, MD     I had a discussion with the patient's family (wife, son, daughter and grand-daughter) about the patient's prognosis and code status. We discussed the patient's main clinical problem, specifically that he has a bleed in his brain. I explained to them that both Neurosurgery and Vascular Neurology have reviewed the case and while any procedure could fix the problem, the likelihood that he returns to his baseline status is poor or he would not tolerate the procedure well. They agreed that the procedure was not appropriate at this time. We further explained that the pressure of his brain is increasing from blood and swelling and that without any intervention, he would likely herniate and cardiac arrest. I spoke to them about CODE STATUS and explained what would happen if his heart were to stop, including CPR and ACLS medications. We discussed that such intervention would be futile and we would be causing more harm than help. They agreed that a DNR was best for this situation. I further explained that from here, we could proceed with comfort care vs no escalation of care. I explained that comfort care would center around focusing on his dignity and allow him to pass without pain. They requested more time to discuss. I will change her CODE STATUS to DNR.    5:25 PM Addendum  Patient has decided to proceed with comfort care. I explained to them at length what to expect with comfort care. They will reach out to the last pieces of his family to make sure no one else wants to come see him before they proceed.     Jeremías Ellis MD  Pulmonary Critical Care Medicine  [facility]

## 2022-06-27 NOTE — CONSULTS
[facility] - [facility] ICU  Neurology  Consult Note    Patient Name: Jose Nieves  MRN: [id number]  Admission Date: 6/25/2022  Hospital Length of Stay: 2 days  Code Status: Full Code   Attending Provider: Linwood Bangura MD   Consulting Provider: Stefanie Cintron NP  Primary Care Physician: Leonard Minaya II, MD  Principal Problem:<principal problem not specified>    Inpatient consult to Vascular (Stroke) Neurology  Consult performed by: Stefanie Cintron NP  Consult ordered by: Jeremías Ellis MD         Subjective:     Chief Complaint:  Dysarthria, left facial droop      HPI:   77 year old male with a past medical history of A Fib on Eliquis, HTN, HLD, and prostate CA presented to ED on 6/24 for dizziness and chills. CT head was negative on 6/25. He reported dizziness was on going for 1 month but was exponentially worse on 6/24. He also had associated nausea. Per EMR symptoms seemed to be improving and he seemed to be progressing well, he complained of neck pain yesterday and was asking for an extra pillow. On 6/27, an RRT was called for acute neurological change. Upon arrival to patients room, he appeared obtunded, he did briefly smile with left facial droop, did not participate in exam, upon arrival to ED, he became stuporous and more difficult to arouse. Nursing reported last known normal around 11:00 am. CT head showed large cerebellar hemorrhage with SAH in the posterior fossa and basal cisterns, mass effect in the posterior fossa with effacement of the 4th ventricle. He was sent to ICU and intubated for airway protection. Discussed with neurology on call who recommended reversal of Eliquis and neurosurgery consult. Vascular neurology was consulted for code FAST/stroke alert.     Of note, he has been seen several times at [facility] on 6/11 and 6/15, he was hypotensive and got IVF and was sent home. He had elevated liver enzymes and was being worked up outpatient (sees Dr. Sorenson).      Stroke alert called at 11:33  Stroke NP responded at 11:35             Past Medical History:   Diagnosis Date    Heart murmur     Hypertension     OAB (overactive bladder)     Prostate cancer        Past Surgical History:   Procedure Laterality Date    robotic prostatectomy  12/2013    VARICOSE VEIN SURGERY         Review of patient's allergies indicates:  No Known Allergies    Current Neurological Medications:     No current facility-administered medications on file prior to encounter.     Current Outpatient Medications on File Prior to Encounter   Medication Sig    amiodarone (PACERONE) 200 MG Tab Take by mouth 2 (two) times daily.    apixaban (ELIQUIS) 5 mg Tab Take 5 mg by mouth 2 (two) times daily.    bimatoprost (LUMIGAN) 0.03 % ophthalmic drops 1 drop every evening.    dorzolamide-timolol 2-0.5% (COSOPT) 22.3-6.8 mg/mL ophthalmic solution 1 drop.    erythromycin (ROMYCIN) ophthalmic ointment every evening.    irbesartan (AVAPRO) 300 MG tablet     lisinopril-hydrochlorothiazide (PRINZIDE,ZESTORETIC) 20-12.5 mg per tablet Take 1 tablet by mouth once daily.    lovastatin (MEVACOR) 40 MG tablet Take 40 mg by mouth every evening.     Family History    None       Tobacco Use    Smoking status: Never Smoker    Smokeless tobacco: Never Used   Substance and Sexual Activity    Alcohol use: No    Drug use: No    Sexual activity: Not on file     Review of Systems   Unable to perform ROS: Acuity of condition   Objective:     Vital Signs (Most Recent):  Temp: 97.6 °F (36.4 °C) (06/27/22 1115)  Pulse: (!) 36 (06/27/22 1138)  Resp: 18 (06/27/22 1117)  BP: (!) 153/85 (06/27/22 1135)  SpO2: 96 % (06/27/22 1135)   Vital Signs (24h Range):  Temp:  [97.6 °F (36.4 °C)-98.5 °F (36.9 °C)] 97.6 °F (36.4 °C)  Pulse:  [36-58] 36  Resp:  [18] 18  SpO2:  [94 %-97 %] 96 %  BP: (110-157)/() 153/85     Weight: 84 kg (185 lb 3 oz)  Body mass index is 27.43 kg/m².    Physical Exam  Vitals reviewed.   Neurological:       Comments:   Initially able to mumble his name during exam, less responsive over time, he would open his eyes to sternal rub  Stuporous   Slowed respirations, appeared to be little effort  Left facial droop  Generally weakness, no obvious unilateral weakness     Did not participate in exam             Significant Labs:   Recent Lab Results         06/27/22  0402        Albumin/Globulin Ratio 0.8       Albumin 2.0       Alkaline Phosphatase 81       ALT 63       AST 48       Baso # 0.04       Basophil % 0.4       BILIRUBIN TOTAL 1.2       BUN 18.3       Calcium 8.1       Chloride 110       CO2 23       Creatinine 1.05       eGFR if non African American >60       Eos # 0.16       Eosinophil % 1.7       Globulin, Total 2.5       Glucose 79       Hematocrit 37.6       Hemoglobin 11.9       Immature Grans (Abs) 0.11       Immature Granulocytes 1.2       Lymph # 1.02       LYMPH % 11.0       MCH 30.6       MCHC 31.6       MCV 96.7       Mono # 0.67       Mono % 7.2       MPV 11.3       Neut # 7.3       Neut % 78.5       nRBC 0.0       Platelets 159       Potassium 3.8       PROTEIN TOTAL 4.5       RBC 3.89       RDW 15.9       Sodium 137       WBC 9.3               Significant Imaging: I have reviewed all pertinent imaging results/findings within the past 24 hours.    CT head 6/27/22   FINDINGS:  A parenchymal hemorrhage in the cerebellum measures approximately 2.7 x 5.1 cm and extends into both hemispheres with surrounding edema.  A there is surrounding subarachnoid hemorrhage in the posterior fossa and basal cisterns.  A small parenchymal hemorrhage in the right frontal lobe measures 4 mm (series 2, image 24).     There is mass effect with effacement of the 4th ventricle and crowding at the foramen magnum.  The lateral ventricles have minimally increased in size.  The calvarium and skull base are intact.  There are chronic changes of the left orbit.     Impression:     1. Cerebellar parenchymal hemorrhage with  subarachnoid hemorrhage in the posterior fossa and basal cisterns.  2. Mass effect in the posterior fossa with effacement of the 4th ventricle and minimal increase in size of the lateral ventricles.  3. Small right frontal lobe parenchymal hemorrhage.    Assessment and Plan:     Cerebellar hemorrhage, acute  Cerebellar hemorrhage    CT head 6/27/22  1. Cerebellar parenchymal hemorrhage with subarachnoid hemorrhage in the posterior fossa and basal cisterns.  2. Mass effect in the posterior fossa with effacement of the 4th ventricle and minimal increase in size of the lateral ventricles.  3. Small right frontal lobe parenchymal hemorrhage.  -Recommend SBP less than 140  -avoid anticoagulation and antiplatelet medications  -consult neurosurgery  -prognosis guarded (age, posterior fossa, intraventricular involvement.)  -Q1 hour neuro checks                VTE Risk Mitigation (From admission, onward)         Ordered     IP VTE HIGH RISK PATIENT  Once         06/25/22 1350     Place sequential compression device  Until discontinued         06/25/22 1350                Thank you for your consult. Further recommendations to follow from MD Stefanie Cintron, NP  Neurology  [facility]    -----    Pt seen and examine by me today.   He is code fast from this AM.  Significant size acute bleed in the posterior fossa, compressing the brainstem and obstructing the 4 th ventricle.  Pt on apixaban for afib.   On exam:   Intubated, not responsive to any stimuli.   Posturing when I touched the RUE and RLE, eyes are disconjugate.   Exam limited as pt family is in the process to choosing comfort care they are just waiting for the sons to get in the hospital.  Wife in the room.     A/p:   Family elected no surgery.   I explained to the wife the gravity of the incident and that event if he was to survive this injury, Mr Nieves would of have very poor quality of life.   The wife expressed understanding of  the prognosis.   - strongly suggested comfort care which they agreed on, just waiting for the son to arrive.   -call for any questions or concerns.

## 2022-06-27 NOTE — PROGRESS NOTES
"[facility]  Hospital Medicine Progress Note        Chief Complaint: Inpatient Follow-up for fatigue     HPI:   77 y.o. Patient male with a past medical history of atrial fibrillation on Eliquis, hypertension, hyperlipidemia, prostate cancer. The patient presented to [facility] on 6/25/2022 with a primary complaint of dizziness and chills which began last night (06/24/2022).  He also complains of a "bad taste" in his mouth. He denies complaints chest pain, shortness of breath, headache, vision changes, focal weakness, cough, abdominal pain, nausea, vomiting, diarrhea.  Patient has been seen in the ED several times this month.  He was seen on at [facility] on 6/11 and 6/15. He was told to be hypotensive and given IV fluids.  COVID-19 and flu were negative.  Liver enzymes were elevated and appointment was scheduled with Dr. Sorenson. Patient reports Dr. Sorenson scheduled an ultrasound for next week.  In addition patient was taken off of his amiodarone and pravastatin.  Patient's cardiologist is Dr. ballesteros.     Upon presentation to the ED, patient afebrile, normotensive, heart rate 68 and SpO2 94% on room air. Heart rate decreased to 48. Labs notable for WBC 23, stable macrocytic anemia, BUN/creatinine 23/1.59 (20/1.54 in September 2021), , troponin 0.044, TSH .223.  UA with 15 rbc's, trace leukocyte, 3+ blood, trace protein. EKG sinus rhythm with occasional premature ventricular complexes and nonspecific ST abnormalities.  Chest x-ray with blunting of the lateral portion of the right costophrenic angle and mild atelectatic changes in the left lung base.  CT of the head negative for acute intracranial findings.  In the ED patient received meclizine and Rocephin was started.       Interval Hx:  Patient doing okay this morning.  His wife is at the bedside.  No fevers overnight.  Overall feeling better but still with significant dizziness when he gets up to go to the " bathroom.  Feels like he is going to fall over.  Discussed getting physical therapy to evaluate and today.  All cultures and workup have remained negative.  He has follow-up scheduled with GI physician this week for further workup of liver injury.  Right upper quadrant ultrasound was negative.  He states that GI physicians and off autoimmune panel which should return later this week.  Discussed of like to see him get up with physical therapy and see how he does with his dizziness.  If stable could potentially go home today.  Will transition from IV Rocephin to oral cefdinir.  Potentially having some upper respiratory infection is responding well to Rocephin.  Leukocytosis back to normal today     Objective/physical exam:  General: In no acute distress, afebrile, no neck abnormalities.  No thyroid tenderness or nodules noted   Chest: Clear to auscultation bilaterally  Heart: RRR, +S1, S2, no appreciable murmur  Abdomen: Soft, nontender, BS +  MSK: Warm, no lower extremity edema, no clubbing or cyanosis  Neurologic: Alert and oriented x4, Cranial nerve II-XII intact, Strength 5/5 in all 4 extremities    VITAL SIGNS: 24 HRS MIN & MAX LAST   Temp  Min: 97.5 °F (36.4 °C)  Max: 98.5 °F (36.9 °C) 97.9 °F (36.6 °C)   BP  Min: 110/71  Max: 137/83 137/83   Pulse  Min: 54  Max: 58  (!) 58   Resp  Min: 18  Max: 20 18   SpO2  Min: 94 %  Max: 99 % 95 %       Recent Labs   Lab 06/25/22  0709 06/26/22  0441 06/27/22  0402   WBC 23.0* 12.9* 9.3   RBC 4.00* 4.03* 3.89*   HGB 12.5* 12.3* 11.9*   HCT 37.9* 39.7* 37.6*   MCV 94.8* 98.5* 96.7*   MCH 31.3* 30.5 30.6   MCHC 33.0 31.0* 31.6*   RDW 15.5 15.7 15.9    132 159   MPV 11.1 11.1 11.3       Recent Labs   Lab 06/25/22  0709 06/26/22  0441 06/27/22  0402    137 137   K 3.8 3.6 3.8   CO2 23 20* 23   BUN 23.0 22.1 18.3   CREATININE 1.59* 1.01 1.05   CALCIUM 8.7* 8.5* 8.1*   ALBUMIN 2.3* 2.1* 2.0*   ALKPHOS 93 79 81   ALT 78* 68* 63*   AST 56* 49* 48*   BILITOT 2.3* 1.2 1.2           Microbiology Results (last 7 days)     Procedure Component Value Units Date/Time    Blood culture #1 **CANNOT BE ORDERED STAT** [[id number]]  (Abnormal) Collected: 06/25/22 1023    Order Status: Completed Specimen: Blood Updated: 06/27/22 0720     CULTURE, BLOOD ([facility]) Gram-positive coccus probable strep     Comment: Identification and Susceptibility to Follow        GRAM STAIN Gram Positive Cocci, probable Streptococcus      Seen in gram stain of broth only      1 of 2 Anaerobic bottles positive     Blood culture #2 **CANNOT BE ORDERED STAT** [[id number]]  (Normal) Collected: 06/25/22 1018    Order Status: Completed Specimen: Blood Updated: 06/26/22 1202     CULTURE, BLOOD ([facility]) No Growth At 24 Hours           See below for Radiology    Scheduled Med:   amiodarone  200 mg Oral BID    apixaban  5 mg Oral BID    cefdinir  300 mg Oral Q12H    losartan  100 mg Oral Daily        Continuous Infusions:   sodium chloride 0.9% 75 mL/hr at 06/26/22 1304        PRN Meds:  acetaminophen, acetaminophen, dextrose 10%, dextrose 10%, glucagon (human recombinant), glucose, glucose, HYDROcodone-acetaminophen, ondansetron, sodium chloride 0.9%       Assessment/Plan:   Dizziness ?  vertigo   Leukocytosis etiology unknown  Macrocytic anemia, stable  CKD stage III  Elevated BNP ? CHF exacerbation  Atrial fibrillation, rate controlled on Eliquis  History of prostate cancer     Leukocytosis resolved.  Still unsure of the source of potential infection.  Will DC Rocephin and changed to oral cefdinir.  Plan for 5 more days.  Repeat urinalysis was negative.  Up with PT and OT today.  Check orthostatic blood pressure.  May need outpatient follow-up with ENT if continue dizziness.  Inpatient hepatic workup negative so far.  Continue to follow-up with Dr. Pancho MCELROY as an outpatient.  Urinary retention has resolved.  Could potentially go home later today if ambulating.    Patient condition:  Stable    Anticipated discharge and  Disposition: TBD      All diagnosis and differential diagnosis have been reviewed; assessment and plan has been documented; I have personally reviewed the labs and test results that are presently available; I have reviewed the patients medication list; I have reviewed the consulting providers response and recommendations. I have reviewed or attempted to review medical records based upon their availability    All of the patient's questions have been  addressed and answered. Patient's is agreeable to the above stated plan. I will continue to monitor closely and make adjustments to medical management as needed.  _____________________________________________________________________      Radiology:  Echo  · The left ventricle is normal in size with concentric remodeling and   normal systolic function.  · The estimated ejection fraction is 55%.  · Grade II left ventricular diastolic dysfunction.  · Normal right ventricular size with normal right ventricular systolic   function.  · There is severe mitral prolapse of multiple scallops of the posterior   mitral leaflet. Morphology consistent with Paige's disease.  · Severe mitral regurgitation.  · There is no pulmonary hypertension.  · The estimated PA systolic pressure is 32 mmHg.  · Normal central venous pressure (3 mmHg).  · Recommend transesophageal echoardiogram for further evaluation of mitral   valve.         Jm Key MD   06/27/2022

## 2022-06-27 NOTE — CONSULTS
[facility]  Consultation  Neurosurgery    Reason for Consultation: cerebellar IPH with mass effect  Consulted by: Jeremías Ellis MD  Date of Consultation: 6/27/2022     Consult called at 1243  At bedside in ICU at 1310    SUBJECTIVE:     Chief Complaint dizziness    History of Present Illness:   Patient is a 77-year old male with a past medical history of atrial fibrillation on Eliquis, CKD Stage III, hypertension, hyperlipidemia, prostate cancer. He has been seen several times at [facility] on 6/11 and 6/15, he was hypotensive and got IVF and was sent home. He had elevated liver enzymes and was being worked up outpatient (sees Dr. Sorenson). The patient presented to [facility] on 6/25/2022 with dizziness and chills. He reported dizziness for about the last month, but significant worsening in his dizziness on 6/24/2022. He was admitted to the hospitalist service for further work-up. CT head on admission reveal no acute injuries or abnormalities. He continued with dizziness throughout his hospital stay, but seems like it may have been improving. Per nursing, there were possible plans for d/c today depending on his mobility. This morning, he had an abrupt neurological change in which he became obtunded. He reportedly had some left facial droop. Last seen normal at about 1100. RRT was called. He was taken for STAT CT head which revealed a large cerebellar hemorrhage with SAH in the posterior fossa and basal cisterns. There is mass effect in the posterior fossa with effacement of the 4th ventricle. He was subsequently intubated d/t neurological decline for airway protection. He has been transferred to the ICU. Dr. Irving has been consulted for large cerebellar hemorrhage.     Eliquis reversal ordered by Neurology. He did receive propofol during intubation, however he has been off of sedation since. Nursing reports no spontaneous movement. His wife is at the bedside. Dr. Irving present at bedside to discuss with his  "wife.     Patient Active Problem List    Diagnosis Date Noted    Cerebellar hemorrhage, acute 06/27/2022     Past Medical History:   Diagnosis Date    Heart murmur     Hypertension     OAB (overactive bladder)     Prostate cancer      Past Surgical History:   Procedure Laterality Date    robotic prostatectomy  12/2013    VARICOSE VEIN SURGERY       Medications Prior to Admission   Medication Sig Dispense Refill Last Dose    amiodarone (PACERONE) 200 MG Tab Take by mouth 2 (two) times daily.       bimatoprost (LUMIGAN) 0.03 % ophthalmic drops 1 drop every evening.       dorzolamide-timolol 2-0.5% (COSOPT) 22.3-6.8 mg/mL ophthalmic solution 1 drop.       erythromycin (ROMYCIN) ophthalmic ointment every evening.       irbesartan (AVAPRO) 300 MG tablet        lisinopril-hydrochlorothiazide (PRINZIDE,ZESTORETIC) 20-12.5 mg per tablet Take 1 tablet by mouth once daily.       lovastatin (MEVACOR) 40 MG tablet Take 40 mg by mouth every evening.       [DISCONTINUED] apixaban (ELIQUIS) 5 mg Tab Take 5 mg by mouth 2 (two) times daily.        Review of patient's allergies indicates:  No Known Allergies  Social History     Tobacco Use    Smoking status: Never Smoker    Smokeless tobacco: Never Used   Substance Use Topics    Alcohol use: No     No family history on file.    Vital Signs  Temp: 97.6 °F (36.4 °C)  Temp src: Oral  Pulse: (!) 36  Heart Rate Source: Monitor  Resp: 18  SpO2: 96 %  Oxygen Concentration (%): 60  O2 Device (Oxygen Therapy): ventilator  BP: (!) 153/85  BP Location: Left arm  BP Method: Automatic  Patient Position: Standing  Height and Weight  Height: 5' 8.9" (175 cm)  Height Method: Stated  Weight: 84 kg (185 lb 3 oz)  Weight Method: Bed Scale  BSA (Calculated - sq m): 2.02 sq meters  BMI (Calculated): 27.4  Weight in (lb) to have BMI = 25: 168.4]    Review of Systems:  Review of systems not obtained due to patient factors intubated, obtunded.    OBJECTIVE:     Vital signs in last 24 " hours:  Temp:  [98.6 °F (37 °C)] 98.6 °F (37 °C)  Pulse:  [49-89] 49  Resp:  [13-18] 14  SpO2:  [96 %-99 %] 99 %  BP: (128-162)/(68-93) 128/86    General:  no distress     Head:  Normocephalic, without obvious abnormality, atraumatic    CV: irregularly irregular, a-fib    Lungs:   Non-labored, intubated on mechanical ventilation.     Abdomen:  Soft, non-distended    Neurological:  Not on sedation  Comatose  Sluggish pupillary reaction bilaterally, pupils equal  (+) corneal, gag  BUEs flaccid, no response to pain  RLE flaccid, no response to pain  Withdraws from pain in LLE  No spontaneous movement noted in extremities, does not open eyes     GCS: Eyes:1  Verbal: T Motor:4   Total: 5      Data Review:   CBC:   Lab Results   Component Value Date    WBC 12.8 (H) 06/27/2022    RBC 4.30 (L) 06/27/2022    HGB 13.1 (L) 06/27/2022    HCT 41.2 (L) 06/27/2022     06/27/2022     BMP:   Lab Results   Component Value Date     06/27/2022    K 4.8 06/27/2022    CO2 21 (L) 06/27/2022    BUN 19.3 06/27/2022    CREATININE 1.17 06/27/2022    CALCIUM 8.5 (L) 06/27/2022     Radiology review: CT head reveals a large cerebellar hemorrhage with SAH in the posterior fossa and basal cisterns. There is mass effect in the posterior fossa with effacement of the 4th ventricle.    ASSESSMENT/PLAN:     Problem List Items Addressed This Visit    Cerebellar hemorrhage, acute     Visit Diagnoses     Dizziness    -  Primary    Relevant Orders    EKG 12-lead (Completed)    Weakness        Relevant Orders    X-Ray Chest AP Portable (Completed)    Pain        Relevant Orders    X-Ray Foot Complete Right (Completed)    Vertigo        Lightheadedness        Leukocytosis, unspecified type        Left-shifted white blood cells        CHF (congestive heart failure)        Relevant Orders    Echo (Completed)    Symptomatic bradycardia        Relevant Orders    EKG 12-lead (Completed)        PLAN  Dr. Irving discussed CT head with the patient's  wife at the bedside. The patient does have a living will. The wife states he would not want to live on long term support (peg, ventilator, etc.). Discussed options, including ventriculostomy, however this would not likely provide any type of meaningful neurologic recovery.  No plans for Neurosurgical intervention at this time.

## 2022-06-27 NOTE — CLINICAL REVIEW
77-year-old male admitted on 6/25/2022 with dizziness.  As of 6/27/2010, the patient continued to experience dizziness.  The patient had a CT scan of the head on 6/27/2022 at 12:04 PM central time which demonstrated:  1. Cerebellar parenchymal hemorrhage with subarachnoid hemorrhage in the posterior fossa and basal cisterns.   2. Mass effect in the posterior fossa with effacement of the 4th ventricle and minimal increase in size of the lateral ventricles.   3. Small right frontal lobe parenchymal hemorrhage.    The patient is presently intubated.  Insurance had requested clinicals prior to the initiation of these events.  Obviously, inpatient level of care is warranted    MD BRITTANY  , Physician Advisor

## 2022-06-27 NOTE — PROGRESS NOTES
Pt not seen by me yet.   Code fast was called.   Hct with posterior fossa bleed, completely occluding the 4th vetricule.   Pt with afib, on apixaban.   Pt HCT on 06/25 was normal.   Discussed with stroke nurse at the code fast time.   - stat NSGY consult.   - Stat blood pressure control strict BP< 140, use nicardipine drip.   - adnexxa to reverse apixaban take this AM at 9:18, 5 mg.  - admit to ICU.   - prognosis guarded (age, posterior fossa, intraventricular involvement.)but it is too early to say  - D/c anticoag (already done) and antiplatelets. For DVT ppx SCD only for now.   - we will talk to the family and update them as well.   - will see the pt shortly  - will continue to follow.

## 2022-06-28 LAB
BACTERIA BLD CULT: ABNORMAL
BACTERIA BLD CULT: ABNORMAL
GRAM STN SPEC: ABNORMAL
PATH REV: NORMAL

## 2022-06-28 NOTE — NURSING
Extubated per family wishes. Family allowed to remain. Withdrew per living will.  Wife , son, & daughter @ bedside.  Pronounced by Dr Ellis @ 2013

## 2022-06-28 NOTE — DISCHARGE SUMMARY
[facility]  Pulmonary Critical Care Note    Patient Name: Jose Nieves  MRN: [id number]  Admission Date: 6/25/2022  Hospital Length of Stay: 2 days  Code Status: DNR  Attending Provider: Linwood Bangura MD  Primary Care Provider: Leonard Minaya II, MD     I was called to the patient's bedside and notified that his rhythm was note to be asystole. Upon entering the room, I was able to confirm this rhythm. At this time, my examination is as follows:    -Absent breath sounds bilaterally  -Absent heart sounds bilaterally  -No evidence of oculocephalic, corneal or pupillary reflex. Does not withdrawal to painful stimuli of all four extremities.  -No evidence of pulses to radial, carotid or femoral areas    Time of Death: 20:13  Cause of Death: Cardiac arrest 2/2 to Cerebellar Parenchymal hemorrhage with herniation     Jeremías Ellis MD  Pulmonary Critical Care Medicine  [facility]

## 2022-06-29 LAB
CMV DNA SPEC QL NAA+PROBE: NEGATIVE
EBV NA AB SER QL: POSITIVE
EBV VCA IGG SER QL: POSITIVE
EBV VCA IGM SER QL: NEGATIVE
IMMUNOLOGIST REVIEW: NORMAL
SPECIMEN SOURCE: NORMAL

## 2022-07-01 LAB
ABO + RH BLD: NORMAL
ABO + RH BLD: NORMAL
BLD PROD TYP BPU: NORMAL
BLD PROD TYP BPU: NORMAL
BLOOD UNIT EXPIRATION DATE: NORMAL
BLOOD UNIT EXPIRATION DATE: NORMAL
BLOOD UNIT TYPE CODE: 5100
BLOOD UNIT TYPE CODE: 5100
CROSSMATCH INTERPRETATION: NORMAL
CROSSMATCH INTERPRETATION: NORMAL
DISPENSE STATUS: NORMAL
DISPENSE STATUS: NORMAL
UNIT NUMBER: NORMAL
UNIT NUMBER: NORMAL

## 2022-09-15 NOTE — DISCHARGE SUMMARY
[facility]  Pulmonary Critical Care Note    Patient Name: Jose Nieves  MRN: [id number]  Admission Date: 2022  Hospital Length of Stay: 2 days  Code Status: Prior  Attending Provider: No att. providers found  Primary Care Provider: Leonard Minaya II, MD    Condition:   Outcome:  Death  DISPOSITION:       Discharge Diagnoses:     Patient Active Problem List   Diagnosis    Cerebellar hemorrhage, acute       Principal Problem:  Cerebellar hemorrhage, acute    Consultants and Procedures:     Consultants:  IP CONSULT TO NEUROSURGERY  IP CONSULT TO VASCULAR (STROKE) NEUROLOGY    Procedures:   * No surgery found *      Brief Admission History:      This is a 77-year old male with a past medical history of atrial fibrillation on Eliquis, CKD Stage III, hypertension, hyperlipidemia, prostate cancer who originally presented on 2022 with a primary complaint of dizziness and chills. Admitted with diagnosis of dizziness of unknown origin. Workup thus far has been negative, including echocardiogram, which shows EF 55%, Grade II diastolic dysfunction, severe mitral prolapse and estimated PASP  32 mm Hg and CT head w/o contrast showed no acute abnormalities.  Today, the patient was noted to have an abrupt neurological change. He was noted to have acute change in mental status along with weakness. A RRT was called and an emergent CT head was done that showed a cerebellar parenchymal hemorrhage with subarachnoid hemorrhage of the posterior fossa and basal cisterns with mass effect in the posterior fossa.  Patient was brought to the ICU and intubated.    Hospital Course with Pertinent Findings:      While in the ICU, both Neurosurgery and Neurology were consulted. A discussion was held with the family and it was decided that the risks of a surgery, given his age, were greater than the potential benefits. The patient began to decompensate and required vasopressors. A family  "discussion was held and it was decided that the patient would be made DNR and withdrawal of care would be initiated. The patient then passed away.    Discharge physical exam:  Vitals  BP: (!) 91/58  Temp: 99.1 °F (37.3 °C)  Temp src: Oral  Pulse: (!) 58  Resp: (!) 24  SpO2: 98 %  Height: 5' 8.9" (175 cm)  Weight: 84 kg (185 lb 3 oz)    Cardiac: Absent heart sounds  Pulmonary: Absent breath sounds  Neurological: Absent oculomotor, pupillary and corneal reflexes  Extremities: Absent carotid, femoral and radial pulses    TIME SPENT ON DISCHARGE: 35 minutes    Time of Death: 20:13    Cause of Death: Cardiac arrest 2/2 to Cerebellar Parenchymal hemorrhage with herniation       Jeremías Ellis MD  Pulmonary Critical Care Medicine  [facility]   \         "